# Patient Record
Sex: FEMALE | Race: WHITE | NOT HISPANIC OR LATINO | Employment: FULL TIME | URBAN - METROPOLITAN AREA
[De-identification: names, ages, dates, MRNs, and addresses within clinical notes are randomized per-mention and may not be internally consistent; named-entity substitution may affect disease eponyms.]

---

## 2017-01-16 ENCOUNTER — OFFICE VISIT (OUTPATIENT)
Dept: FAMILY MEDICINE | Facility: CLINIC | Age: 27
End: 2017-01-16
Payer: COMMERCIAL

## 2017-01-16 VITALS
RESPIRATION RATE: 17 BRPM | WEIGHT: 184.75 LBS | SYSTOLIC BLOOD PRESSURE: 98 MMHG | HEIGHT: 66 IN | BODY MASS INDEX: 29.69 KG/M2 | DIASTOLIC BLOOD PRESSURE: 70 MMHG | TEMPERATURE: 98.1 F | HEART RATE: 67 BPM | OXYGEN SATURATION: 98 %

## 2017-01-16 DIAGNOSIS — Z30.41 ENCOUNTER FOR SURVEILLANCE OF CONTRACEPTIVE PILLS: ICD-10-CM

## 2017-01-16 DIAGNOSIS — Z01.419 ENCOUNTER FOR GYNECOLOGICAL EXAMINATION WITHOUT ABNORMAL FINDING: Primary | ICD-10-CM

## 2017-01-16 DIAGNOSIS — L70.0 ACNE VULGARIS: ICD-10-CM

## 2017-01-16 PROCEDURE — 99395 PREV VISIT EST AGE 18-39: CPT | Performed by: PHYSICIAN ASSISTANT

## 2017-01-16 PROCEDURE — G0145 SCR C/V CYTO,THINLAYER,RESCR: HCPCS | Performed by: PHYSICIAN ASSISTANT

## 2017-01-16 RX ORDER — LEVONORGESTREL/ETHIN.ESTRADIOL 0.1-0.02MG
1 TABLET ORAL DAILY
Qty: 84 TABLET | Refills: 3 | Status: SHIPPED | OUTPATIENT
Start: 2017-01-16 | End: 2017-12-28

## 2017-01-16 NOTE — MR AVS SNAPSHOT
After Visit Summary   1/16/2017    Poonam Jeronimo    MRN: [id number]           Patient Information     Date Of Birth          1990        Visit Information        Provider Department      1/16/2017 7:40 AM Brigette Metcalf PA-C [facility]        Today's Diagnoses     Encounter for gynecological examination without abnormal finding    -  1     Acne vulgaris         Encounter for surveillance of contraceptive pills           Care Instructions      Preventive Health Recommendations  Female Ages 26 - 39  Yearly exam:   See your health care provider every year in order to    Review health changes.     Discuss preventive care.      Review your medicines if you your doctor has prescribed any.    Until age 30: Get a Pap test every three years (more often if you have had an abnormal result).    After age 30: Talk to your doctor about whether you should have a Pap test every 3 years or have a Pap test with HPV screening every 5 years.   You do not need a Pap test if your uterus was removed (hysterectomy) and you have not had cancer.  You should be tested each year for STDs (sexually transmitted diseases), if you're at risk.   Talk to your provider about how often to have your cholesterol checked.  If you are at risk for diabetes, you should have a diabetes test (fasting glucose).  Shots: Get a flu shot each year. Get a tetanus shot every 10 years.   Nutrition:     Eat at least 5 servings of fruits and vegetables each day.    Eat whole-grain bread, whole-wheat pasta and brown rice instead of white grains and rice.    Talk to your provider about Calcium and Vitamin D.     Lifestyle    Exercise at least 150 minutes a week (30 minutes a day, 5 days of the week). This will help you control your weight and prevent disease.    Limit alcohol to one drink per day.    No smoking.     Wear sunscreen to prevent skin cancer.    See your dentist every six months for an exam and cleaning.          "   Follow-ups after your visit        Who to contact     If you have questions or need follow up information about today's clinic visit or your schedule please contact [facility] directly at [phone].  Normal or non-critical lab and imaging results will be communicated to you by MyChart, letter or phone within 4 business days after the clinic has received the results. If you do not hear from us within 7 days, please contact the clinic through [employer] or phone. If you have a critical or abnormal lab result, we will notify you by phone as soon as possible.  Submit refill requests through [employer] or call your pharmacy and they will forward the refill request to us. Please allow 3 business days for your refill to be completed.          Additional Information About Your Visit        [employer] Information     [employer] gives you secure access to your electronic health record. If you see a primary care provider, you can also send messages to your care team and make appointments. If you have questions, please call your primary care clinic.  If you do not have a primary care provider, please call [phone] and they will assist you.        Care EveryWhere ID     This is your Care EveryWhere ID. This could be used by other organizations to access your [facility] medical records  [id number]        Your Vitals Were     Pulse Temperature Respirations    67 98.1  F (36.7  C) (Oral) 17    Height BMI (Body Mass Index) Pulse Oximetry    5' 6\" (1.676 m) 29.83 kg/m2 98%    Last Period          12/28/2016 (Approximate)         Blood Pressure from Last 3 Encounters:   01/16/17 98/70   01/06/16 114/61   12/07/15 107/65    Weight from Last 3 Encounters:   01/16/17 184 lb 12 oz (83.802 kg)   01/06/16 180 lb (81.647 kg)   12/07/15 182 lb 4.8 oz (82.691 kg)              We Performed the Following     PAP imaged thin layer screen reflex to HPV if ASCUS - recommended age 25 - 29 years          Today's Medication Changes        "   These changes are accurate as of: 1/16/17  7:49 AM.  If you have any questions, ask your nurse or doctor.               These medicines have changed or have updated prescriptions.        Dose/Directions    levonorgestrel-ethinyl estradiol 0.1-20 MG-MCG per tablet   Commonly known as:  LUTERA   This may have changed:  additional instructions   Used for:  Encounter for surveillance of contraceptive pills, Acne vulgaris   Changed by:  Brigette Metcalf PA-C        Dose:  1 tablet   Take 1 tablet by mouth daily   Quantity:  84 tablet   Refills:  3         Stop taking these medicines if you haven't already. Please contact your care team if you have questions.     atovaquone-proguanil 250-100 MG per tablet   Commonly known as:  MALARONE   Stopped by:  Brigette Metcalf PA-C           azithromycin 500 MG tablet   Commonly known as:  ZITHROMAX   Stopped by:  Brigette Metcalf PA-C           doxycycline 100 MG tablet   Commonly known as:  VIBRA-TABS   Stopped by:  Brigette Metcalf PA-C           spironolactone 50 MG tablet   Commonly known as:  ALDACTONE   Stopped by:  Brigette Metcalf PA-C                Where to get your medicines      These medications were sent to [facility] [address] [place] [address] AT [address] [address]    Hours:  24-hours Phone:  [phone]    - levonorgestrel-ethinyl estradiol 0.1-20 MG-MCG per tablet             Primary Care Provider Office Phone # Fax #    Rosa Maria Zamora [phone]       [address] [place] [id number]        Thank you!     Thank you for choosing [facility]  for your care. Our goal is always to provide you with excellent care. Hearing back from our patients is one way we can continue to improve our services. Please take a few minutes to complete the written survey that you may  receive in the mail after your visit with us. Thank you!             Your Updated Medication List - Protect others around you: Learn how to safely use, store and throw away your medicines at www.disposemymeds.org.          This list is accurate as of: 1/16/17  7:49 AM.  Always use your most recent med list.                   Brand Name Dispense Instructions for use    levonorgestrel-ethinyl estradiol 0.1-20 MG-MCG per tablet    LUTERA    84 tablet    Take 1 tablet by mouth daily

## 2017-01-16 NOTE — NURSING NOTE
"Chief Complaint   Patient presents with     Physical       Initial BP 98/70 mmHg  Pulse 67  Temp(Src) 98.1  F (36.7  C) (Oral)  Resp 17  Ht 5' 6\" (1.676 m)  Wt 184 lb 12 oz (83.802 kg)  BMI 29.83 kg/m2  SpO2 98%  LMP 12/28/2016 (Approximate) Estimated body mass index is 29.83 kg/(m^2) as calculated from the following:    Height as of this encounter: 5' 6\" (1.676 m).    Weight as of this encounter: 184 lb 12 oz (83.802 kg).  BP completed using cuff size: mary Rogers CMA  "

## 2017-01-16 NOTE — PROGRESS NOTES
SUBJECTIVE:     CC: Poonam Jeronimo is an 26 year old woman who presents for preventive health visit.     Healthy Habits:    Do you get at least three servings of calcium containing foods daily (dairy, green leafy vegetables, etc.)? yes    Amount of exercise or daily activities, outside of work: 2-3 day(s) per week    Problems taking medications regularly: No    Medication side effects: No    Have you had an eye exam in the past two years? Yes    Do you see a dentist twice per year? yes    Do you have sleep apnea, excessive snoring or daytime drowsiness? no        Today's PHQ-2 Score:   PHQ-2 ( 1999 Pfizer) 1/16/2017 2/9/2015   Q1: Little interest or pleasure in doing things 0 0   Q2: Feeling down, depressed or hopeless 0 0   PHQ-2 Score 0 0       Abuse: Current or Past(Physical, Sexual or Emotional)- No  Do you feel safe in your environment - Yes    Social History   Substance Use Topics     Smoking status: Never Smoker      Smokeless tobacco: Never Used     Alcohol Use: 1.5 - 2.0 oz/week     3-4 Standard drinks or equivalent per week     The patient does not drink >3 drinks per day nor >7 drinks per week.    Reviewed orders with patient.  Reviewed health maintenance and updated orders accordingly - Yes    Mammo Decision Support:  Mammogram not appropriate for this patient based on age.    Pertinent mammograms are reviewed under the imaging tab.  History of abnormal Pap smear: NO - age 21-29 PAP every 3 years recommended  All Histories reviewed and updated in Epic.      ROS:  C: NEGATIVE for fever, chills, change in weight  I: NEGATIVE for worrisome rashes, moles or lesions  E: NEGATIVE for vision changes or irritation  ENT: NEGATIVE for ear, mouth and throat problems  R: NEGATIVE for significant cough or SOB  B: NEGATIVE for masses, tenderness or discharge  CV: NEGATIVE for chest pain, palpitations or peripheral edema  GI: NEGATIVE for nausea, abdominal pain, heartburn, or change in bowel habits  : NEGATIVE for  "unusual urinary or vaginal symptoms. Periods are regular.  M: NEGATIVE for significant arthralgias or myalgia  N: NEGATIVE for weakness, dizziness or paresthesias  P: NEGATIVE for changes in mood or affect    Problem list, Medication list, Allergies, and Medical/Social/Surgical histories reviewed in [facility] and updated as appropriate.  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 No pertinent past surgical history.    Social History   Substance Use Topics     Smoking status: Never Smoker      Smokeless tobacco: Never Used     Alcohol Use: 1.5 - 2.0 oz/week     3-4 Standard drinks or equivalent per week     Family History   Problem Relation Age of Onset     Breast Cancer Maternal Grandmother      In 50s.      CANCER No family hx of      skin cancer         Current Outpatient Prescriptions   Medication Sig Dispense Refill     levonorgestrel-ethinyl estradiol (LUTERA) 0.1-20 MG-MCG per tablet Take 1 tablet by mouth daily 84 tablet 3     [DISCONTINUED] levonorgestrel-ethinyl estradiol (LUTERA) 0.1-20 MG-MCG per tablet Take 1 tablet by mouth daily LETTER SENT/ MD APPT. FOR REFILLS 30 tablet 0     No Known Allergies  OBJECTIVE:     BP 98/70 mmHg  Pulse 67  Temp(Src) 98.1  F (36.7  C) (Oral)  Resp 17  Ht 5' 6\" (1.676 m)  Wt 184 lb 12 oz (83.802 kg)  BMI 29.83 kg/m2  SpO2 98%  LMP 12/28/2016 (Approximate)  EXAM:  GENERAL: healthy, alert and no distress  EYES: Eyes grossly normal to inspection, PERRL and conjunctivae and sclerae normal  HENT: ear canals and TM's normal, nose and mouth without ulcers or lesions  NECK: no adenopathy, no asymmetry, masses, or scars and thyroid normal to palpation  RESP: lungs clear to auscultation - no rales, rhonchi or wheezes  BREAST: normal without masses, tenderness or nipple discharge and no palpable axillary masses or adenopathy  CV: regular rate and rhythm, normal S1 S2, no S3 or S4, no " "murmur, click or rub, no peripheral edema and peripheral pulses strong  ABDOMEN: soft, nontender, no hepatosplenomegaly, no masses and bowel sounds normal   (female): normal female external genitalia, normal urethral meatus, vaginal mucosa pink, moist, well rugated, and normal cervix/adnexa/uterus without masses or discharge; pap smear done today  MS: no gross musculoskeletal defects noted, no edema  SKIN: no suspicious lesions or rashes  NEURO: Normal strength and tone, mentation intact and speech normal  PSYCH: mentation appears normal, affect normal/bright    ASSESSMENT/PLAN:         ICD-10-CM    1. Encounter for gynecological examination without abnormal finding Z01.419 PAP imaged thin layer screen reflex to HPV if ASCUS - recommended age 25 - 29 years   2. Acne vulgaris L70.0 levonorgestrel-ethinyl estradiol (LUTERA) 0.1-20 MG-MCG per tablet   3. Encounter for surveillance of contraceptive pills Z30.41 levonorgestrel-ethinyl estradiol (LUTERA) 0.1-20 MG-MCG per tablet       COUNSELING:   Reviewed preventive health counseling, as reflected in patient instructions       Regular exercise       Healthy diet/nutrition       Vision screening       Safe sex practices/STD prevention     reports that she has never smoked. She has never used smokeless tobacco.  Estimated body mass index is 29.83 kg/(m^2) as calculated from the following:    Height as of this encounter: 5' 6\" (1.676 m).    Weight as of this encounter: 184 lb 12 oz (83.802 kg).   Weight management plan: Discussed healthy diet and exercise guidelines and patient will follow up in 12 months in clinic to re-evaluate.    Counseling Resources:  ATP IV Guidelines  Pooled Cohorts Equation Calculator  Breast Cancer Risk Calculator  FRAX Risk Assessment  ICSI Preventive Guidelines  Dietary Guidelines for Americans, 2010  USDA's MyPlate  ASA Prophylaxis  Lung CA Screening    Brigette Metcalf PA-C  [facility]  "

## 2017-01-18 LAB
COPATH REPORT: NORMAL
PAP: NORMAL

## 2017-12-26 DIAGNOSIS — L70.0 ACNE VULGARIS: ICD-10-CM

## 2017-12-26 DIAGNOSIS — Z30.41 ENCOUNTER FOR SURVEILLANCE OF CONTRACEPTIVE PILLS: ICD-10-CM

## 2017-12-27 NOTE — TELEPHONE ENCOUNTER
Requested Prescriptions   Pending Prescriptions Disp Refills     AVIANE 0.1-20 MG-MCG per tablet [Pharmacy Med Name: AVIANE-28 TABLETS 28]  Last Written Prescription Date:  1/16/2017  Last Fill Quantity: 84 tablet,  # refills: 3   Last Office Visit with [facility], P or [facility] prescribing provider:  1/16/2017   Future Office Visit:      84 tablet 0     Sig: TAKE 1 TABLET BY MOUTH DAILY    Contraceptives Protocol Passed    12/26/2017  9:00 PM       Passed - Patient is not a current smoker if age is 35 or older       Passed - Recent or future visit with authorizing provider's specialty    Patient had office visit in the last year or has a visit in the next 30 days with authorizing provider.  See chart review.          Passed - No active pregnancy on record       Passed - No positive pregnancy test in past 12 months

## 2017-12-28 RX ORDER — LEVONORGESTREL AND ETHINYL ESTRADIOL 0.1-0.02MG
KIT ORAL
Qty: 84 TABLET | Refills: 0 | OUTPATIENT
Start: 2017-12-28

## 2017-12-28 RX ORDER — LEVONORGESTREL/ETHIN.ESTRADIOL 0.1-0.02MG
1 TABLET ORAL DAILY
Qty: 84 TABLET | Refills: 0 | Status: SHIPPED | OUTPATIENT
Start: 2017-12-28 | End: 2018-02-14

## 2017-12-28 NOTE — TELEPHONE ENCOUNTER
Reminder placed on refill order that she is due for annual office visit.    Prescription approved per [facility] Refill Protocol.  Sandra Haines RN

## 2018-02-14 ENCOUNTER — OFFICE VISIT (OUTPATIENT)
Dept: FAMILY MEDICINE | Facility: CLINIC | Age: 28
End: 2018-02-14
Payer: COMMERCIAL

## 2018-02-14 VITALS
SYSTOLIC BLOOD PRESSURE: 97 MMHG | DIASTOLIC BLOOD PRESSURE: 61 MMHG | BODY MASS INDEX: 27.64 KG/M2 | HEART RATE: 69 BPM | RESPIRATION RATE: 14 BRPM | TEMPERATURE: 98.4 F | HEIGHT: 66 IN | OXYGEN SATURATION: 98 % | WEIGHT: 172 LBS

## 2018-02-14 DIAGNOSIS — Z00.00 ROUTINE GENERAL MEDICAL EXAMINATION AT A HEALTH CARE FACILITY: Primary | ICD-10-CM

## 2018-02-14 DIAGNOSIS — Z13.220 SCREENING FOR LIPID DISORDERS: ICD-10-CM

## 2018-02-14 DIAGNOSIS — L70.0 ACNE VULGARIS: ICD-10-CM

## 2018-02-14 DIAGNOSIS — Z30.41 ENCOUNTER FOR SURVEILLANCE OF CONTRACEPTIVE PILLS: ICD-10-CM

## 2018-02-14 DIAGNOSIS — Z13.1 SCREENING FOR DIABETES MELLITUS: ICD-10-CM

## 2018-02-14 LAB
CHOLEST SERPL-MCNC: 169 MG/DL
GLUCOSE SERPL-MCNC: 78 MG/DL (ref 70–99)
HDLC SERPL-MCNC: 51 MG/DL
LDLC SERPL CALC-MCNC: 101 MG/DL
NONHDLC SERPL-MCNC: 118 MG/DL
TRIGL SERPL-MCNC: 85 MG/DL

## 2018-02-14 PROCEDURE — 82947 ASSAY GLUCOSE BLOOD QUANT: CPT | Performed by: FAMILY MEDICINE

## 2018-02-14 PROCEDURE — 80061 LIPID PANEL: CPT | Performed by: FAMILY MEDICINE

## 2018-02-14 PROCEDURE — 99395 PREV VISIT EST AGE 18-39: CPT | Performed by: FAMILY MEDICINE

## 2018-02-14 PROCEDURE — 36415 COLL VENOUS BLD VENIPUNCTURE: CPT | Performed by: FAMILY MEDICINE

## 2018-02-14 RX ORDER — LEVONORGESTREL/ETHIN.ESTRADIOL 0.1-0.02MG
1 TABLET ORAL DAILY
Qty: 84 TABLET | Refills: 3 | Status: SHIPPED | OUTPATIENT
Start: 2018-02-14 | End: 2019-02-11

## 2018-02-14 NOTE — PROGRESS NOTES
SUBJECTIVE:   CC: Poonam Jeronimo is an 27 year old woman who presents for preventive health visit.     Physical   Annual:     Getting at least 3 servings of Calcium per day::  Yes    Bi-annual eye exam::  NO    Dental care twice a year::  Yes    Sleep apnea or symptoms of sleep apnea::  None    Diet::  Other    Frequency of exercise::  None    Taking medications regularly::  Yes    Medication side effects::  None    Additional concerns today::  No            Refill birth control    Today's PHQ-2 Score:   PHQ-2 ( 1999 Pfizer) 2/14/2018   Q1: Little interest or pleasure in doing things 0   Q2: Feeling down, depressed or hopeless 0   PHQ-2 Score 0   Q1: Little interest or pleasure in doing things Not at all   Q2: Feeling down, depressed or hopeless Not at all   PHQ-2 Score 0       Abuse: Current or Past(Physical, Sexual or Emotional)- No  Do you feel safe in your environment - Yes    Social History   Substance Use Topics     Smoking status: Never Smoker     Smokeless tobacco: Never Used     Alcohol use 1.5 - 2.0 oz/week     3 - 4 Standard drinks or equivalent per week     Alcohol Use 2/14/2018   If you drink alcohol, do you typically have greater than 3 drinks per day OR greater than 7 drinks per week?   No       Reviewed orders with patient.  Reviewed health maintenance and updated orders accordingly - Yes  Labs reviewed in EPIC    Mammogram not appropriate for this patient based on age.    Pertinent mammograms are reviewed under the imaging tab.  History of abnormal Pap smear: NO - age 21-29 PAP every 3 years recommended    Reviewed and updated as needed this visit by clinical staff         Reviewed and updated as needed this visit by Provider            Review of Systems  C: NEGATIVE for fever, chills, change in weight  I: NEGATIVE for worrisome rashes, moles or lesions  E: NEGATIVE for vision changes or irritation  ENT: NEGATIVE for ear, mouth and throat problems  R: NEGATIVE for significant cough or SOB  B:  "NEGATIVE for masses, tenderness or discharge  CV: NEGATIVE for chest pain, palpitations or peripheral edema  GI: NEGATIVE for nausea, abdominal pain, heartburn, or change in bowel habits  : NEGATIVE for unusual urinary or vaginal symptoms. Periods are regular.  M: NEGATIVE for significant arthralgias or myalgia  N: NEGATIVE for weakness, dizziness or paresthesias  P: NEGATIVE for changes in mood or affect     OBJECTIVE:   Physical Exam   BP 97/61 (BP Location: Left arm)  Pulse 69  Temp 98.4  F (36.9  C) (Oral)  Resp 14  Ht 5' 6\" (1.676 m)  Wt 172 lb (78 kg)  LMP 01/24/2018 (Approximate)  SpO2 98%  BMI 27.76 kg/m2  GENERAL: healthy, alert and no distress  EYES: Eyes grossly normal to inspection, PERRL and conjunctivae and sclerae normal  HENT: ear canals and TM's normal, nose and mouth without ulcers or lesions  NECK: no adenopathy, no asymmetry, masses, or scars and thyroid normal to palpation  RESP: lungs clear to auscultation - no rales, rhonchi or wheezes  BREAST: normal without masses, tenderness or nipple discharge and no palpable axillary masses or adenopathy  CV: regular rate and rhythm, normal S1 S2  ABDOMEN: soft, nontender, no hepatosplenomegaly, no masses and bowel sounds normal  MS: no gross musculoskeletal defects noted, no edema  SKIN: no suspicious lesions or rashes  NEURO: Normal strength and tone, mentation intact and speech normal  PSYCH: mentation appears normal, affect normal/bright    ASSESSMENT/PLAN:       1. Routine general medical examination at a health care facility  - see below     2. Encounter for surveillance of contraceptive pills  - refilled   - levonorgestrel-ethinyl estradiol (LUTERA) 0.1-20 MG-MCG per tablet; Take 1 tablet by mouth daily  Dispense: 84 tablet; Refill: 3    3. Acne vulgaris  - levonorgestrel-ethinyl estradiol (LUTERA) 0.1-20 MG-MCG per tablet; Take 1 tablet by mouth daily  Dispense: 84 tablet; Refill: 3    4. Screening for diabetes mellitus  - Glucose    5. " "Screening for lipid disorders  - Lipid Profile (Chol, Trig, HDL, LDL calc)    COUNSELING:  Reviewed preventive health counseling, as reflected in patient instructions       Regular exercise         reports that she has never smoked. She has never used smokeless tobacco.    Estimated body mass index is 29.82 kg/(m^2) as calculated from the following:    Height as of 1/16/17: 5' 6\" (1.676 m).    Weight as of 1/16/17: 184 lb 12 oz (83.8 kg).       Counseling Resources:  ATP IV Guidelines  Pooled Cohorts Equation Calculator  Breast Cancer Risk Calculator  FRAX Risk Assessment  ICSI Preventive Guidelines  Dietary Guidelines for Americans, 2010  USDA's MyPlate  ASA Prophylaxis  Lung CA Screening    Deqa Jb Ferreira MD  [facility]  Answers for HPI/ROS submitted by the patient on 2/14/2018   PHQ-2 Score: 0    "

## 2018-02-14 NOTE — MR AVS SNAPSHOT
After Visit Summary   2/14/2018    Poonam Jeronimo    MRN: [id number]           Patient Information     Date Of Birth          1990        Visit Information        Provider Department      2/14/2018 7:20 AM Gilda Ferreira MD [facility]        Today's Diagnoses     Routine general medical examination at a health care facility    -  1    Encounter for surveillance of contraceptive pills        Acne vulgaris        Screening for diabetes mellitus        Screening for lipid disorders          Care Instructions      Preventive Health Recommendations  Female Ages 26 - 39  Yearly exam:   See your health care provider every year in order to    Review health changes.     Discuss preventive care.      Review your medicines if you your doctor has prescribed any.    Until age 30: Get a Pap test every three years (more often if you have had an abnormal result).    After age 30: Talk to your doctor about whether you should have a Pap test every 3 years or have a Pap test with HPV screening every 5 years.   You do not need a Pap test if your uterus was removed (hysterectomy) and you have not had cancer.  You should be tested each year for STDs (sexually transmitted diseases), if you're at risk.   Talk to your provider about how often to have your cholesterol checked.  If you are at risk for diabetes, you should have a diabetes test (fasting glucose).  Shots: Get a flu shot each year. Get a tetanus shot every 10 years.   Nutrition:     Eat at least 5 servings of fruits and vegetables each day.    Eat whole-grain bread, whole-wheat pasta and brown rice instead of white grains and rice.    Talk to your provider about Calcium and Vitamin D.     Lifestyle    Exercise at least 150 minutes a week (30 minutes a day, 5 days of the week). This will help you control your weight and prevent disease.    Limit alcohol to one drink per day.    No smoking.     Wear sunscreen to prevent skin cancer.    See  "your dentist every six months for an exam and cleaning.            Follow-ups after your visit        Who to contact     If you have questions or need follow up information about today's clinic visit or your schedule please contact [facility] directly at [phone].  Normal or non-critical lab and imaging results will be communicated to you by MyChart, letter or phone within 4 business days after the clinic has received the results. If you do not hear from us within 7 days, please contact the clinic through [email or web address] or phone. If you have a critical or abnormal lab result, we will notify you by phone as soon as possible.  Submit refill requests through [employer] or call your pharmacy and they will forward the refill request to us. Please allow 3 business days for your refill to be completed.          Additional Information About Your Visit        MyChart Information     [employer] gives you secure access to your electronic health record. If you see a primary care provider, you can also send messages to your care team and make appointments. If you have questions, please call your primary care clinic.  If you do not have a primary care provider, please call [phone] and they will assist you.        Care EveryWhere ID     This is your Care EveryWhere ID. This could be used by other organizations to access your [place] medical records  [id number]        Your Vitals Were     Pulse Temperature Respirations Height Last Period Pulse Oximetry    69 98.4  F (36.9  C) (Oral) 14 5' 6\" (1.676 m) 01/24/2018 (Approximate) 98%    BMI (Body Mass Index)                   27.76 kg/m2            Blood Pressure from Last 3 Encounters:   02/14/18 97/61   01/16/17 98/70   01/06/16 114/61    Weight from Last 3 Encounters:   02/14/18 172 lb (78 kg)   01/16/17 184 lb 12 oz (83.8 kg)   01/06/16 180 lb (81.6 kg)              We Performed the Following     Glucose     Lipid Profile (Chol, Trig, HDL, LDL calc)          Where to " get your medicines      These medications were sent to [id number] - [facility] [address] [facility] [address]  [address] [address]     Phone:  [phone]     levonorgestrel-ethinyl estradiol 0.1-20 MG-MCG per tablet          Primary Care Provider Office Phone # Fax #    Rosa Maria Eboni Zamora [phone]       [address] [place] [address] [address]  [facility] [address]        Equal Access to Services     [facility]: Hadii aad ku hadasho Soomaali, waaxda luqadaha, qaybta kaalmada adeegyada, niko fournier . So [facility] [phone].    ATENCIÓN: Si habla español, tiene a villalta disposición servicios gratuitos de asistencia lingüística. [facility] [phone].    We comply with applicable federal civil rights laws and [place] laws. We do not discriminate on the basis of race, color, national origin, age, disability, sex, sexual orientation, or gender identity.            Thank you!     Thank you for choosing [facility]  for your care. Our goal is always to provide you with excellent care. Hearing back from our patients is one way we can continue to improve our services. Please take a few minutes to complete the written survey that you may receive in the mail after your visit with us. Thank you!             Your Updated Medication List - Protect others around you: Learn how to safely use, store and throw away your medicines at www.disposemymeds.org.          This list is accurate as of 2/14/18  7:52 AM.  Always use your most recent med list.                   Brand Name Dispense Instructions for use Diagnosis    levonorgestrel-ethinyl estradiol 0.1-20 MG-MCG per tablet    LUTERA    84 tablet    Take 1 tablet by mouth daily    Encounter for surveillance of contraceptive pills, Acne vulgaris

## 2018-02-14 NOTE — NURSING NOTE
"Chief Complaint   Patient presents with     Physical       Initial BP 97/61 (BP Location: Left arm)  Pulse 69  Temp 98.4  F (36.9  C) (Oral)  Resp 14  Ht 5' 6\" (1.676 m)  Wt 172 lb (78 kg)  LMP 01/24/2018 (Approximate)  SpO2 98%  BMI 27.76 kg/m2 Estimated body mass index is 27.76 kg/(m^2) as calculated from the following:    Height as of this encounter: 5' 6\" (1.676 m).    Weight as of this encounter: 172 lb (78 kg).  Medication Reconciliation: complete     Megan Shah CMA      "

## 2018-06-28 ENCOUNTER — OFFICE VISIT (OUTPATIENT)
Dept: OBGYN | Facility: CLINIC | Age: 28
End: 2018-06-28
Payer: COMMERCIAL

## 2018-06-28 VITALS — BODY MASS INDEX: 28.25 KG/M2 | DIASTOLIC BLOOD PRESSURE: 80 MMHG | WEIGHT: 175 LBS | SYSTOLIC BLOOD PRESSURE: 104 MMHG

## 2018-06-28 DIAGNOSIS — Z84.2 FAMILY HISTORY OF ENDOMETRIOSIS: Primary | ICD-10-CM

## 2018-06-28 PROCEDURE — 99203 OFFICE O/P NEW LOW 30 MIN: CPT | Performed by: ADVANCED PRACTICE MIDWIFE

## 2018-06-28 NOTE — MR AVS SNAPSHOT
After Visit Summary   6/28/2018    Poonam Jeronimo    MRN: [id number]           Patient Information     Date Of Birth          1990        Visit Information        Provider Department      6/28/2018 8:30 AM Juana Monroe APRN CNM [facility]        Today's Diagnoses     Family history of endometriosis    -  1       Follow-ups after your visit        Follow-up notes from your care team     Return if symptoms worsen or fail to improve.      Your next 10 appointments already scheduled     Aug 01, 2018  8:15 AM CDT   US PELVIC COMPLETE W TRANSVAGINAL with RIUS1   [facility] ([facility])    [address]  [address]  [facility] [address] [phone]           Please bring a list of your medicines (including vitamins, minerals and over-the-counter drugs). Also, tell your doctor about any allergies you may have. Wear comfortable clothes and leave your valuables at home.  Adults: Drink six 8-ounce glasses of fluid one hour before your exam. Do NOT empty your bladder.  If you need to empty your bladder before your exam, try to release only a little bit of urine. Then, drink another 8oz glass of fluid.  Children: Children who are potty trained should drink at least 4 cups (32 oz) of liquid 45 minutes to one hour prior to the exam. The child s bladder must be full in order to achieve a diagnostic exam. If your child is very uncomfortable or has an urgent need to pee, please notify a technologist; they will try to find out how much longer the wait may be and provide instructions to help relieve the pressure. Occasionally it is medically necessary to insert a urinary catheter to fill the bladder.  Please call the Imaging Department at your exam site with any questions.              Future tests that were ordered for you today     Open Future Orders        Priority Expected Expires Ordered    US Pelvic Complete w Transvaginal Routine   6/28/2019 6/28/2018            Who to contact     If you have questions or need follow up information about today's clinic visit or your schedule please contact [facility] directly at [phone].  Normal or non-critical lab and imaging results will be communicated to you by MyChart, letter or phone within 4 business days after the clinic has received the results. If you do not hear from us within 7 days, please contact the clinic through MyChart or phone. If you have a critical or abnormal lab result, we will notify you by phone as soon as possible.  Submit refill requests through [employer] or call your pharmacy and they will forward the refill request to us. Please allow 3 business days for your refill to be completed.          Additional Information About Your Visit        [email or web address] Information     [employer] gives you secure access to your electronic health record. If you see a primary care provider, you can also send messages to your care team and make appointments. If you have questions, please call your primary care clinic.  If you do not have a primary care provider, please call [phone] and they will assist you.        Care EveryWhere ID     This is your Care EveryWhere ID. This could be used by other organizations to access your [place] medical records  [id number]        Your Vitals Were     Last Period BMI (Body Mass Index)                06/12/2018 (Exact Date) 28.25 kg/m2           Blood Pressure from Last 3 Encounters:   06/28/18 104/80   02/14/18 97/61   01/16/17 98/70    Weight from Last 3 Encounters:   06/28/18 175 lb (79.4 kg)   02/14/18 172 lb (78 kg)   01/16/17 184 lb 12 oz (83.8 kg)               Primary Care Provider Office Phone # Fax #    Rosa Maria Zamora [phone]       [address] [place] [address] [address] [place] [address]        Equal Access to Services     CHAN MALAVE AH: Stephanie Narayan, regine jeffery, claudia huang,  niko ruelasrossy aceves'aan ah. So [facility] [phone].    ATENCIÓN: Si habla latisha, tiene a villalta disposición servicios gratuitos de asistencia lingüística. [facility] al [phone].    We comply with applicable federal civil rights laws and [place] laws. We do not discriminate on the basis of race, color, national origin, age, disability, sex, sexual orientation, or gender identity.            Thank you!     Thank you for choosing [facility]  for your care. Our goal is always to provide you with excellent care. Hearing back from our patients is one way we can continue to improve our services. Please take a few minutes to complete the written survey that you may receive in the mail after your visit with us. Thank you!             Your Updated Medication List - Protect others around you: Learn how to safely use, store and throw away your medicines at www.disposemymeds.org.          This list is accurate as of 6/28/18  9:22 AM.  Always use your most recent med list.                   Brand Name Dispense Instructions for use Diagnosis    levonorgestrel-ethinyl estradiol 0.1-20 MG-MCG per tablet    LUTERA    84 tablet    Take 1 tablet by mouth daily    Encounter for surveillance of contraceptive pills, Acne vulgaris

## 2018-06-28 NOTE — PROGRESS NOTES
"  SUBJECTIVE:                                                   Poonam Jeronimo is a 27 year old who presents to clinic today for the following health issue(s):  Patient presents with:  Consult: Concerns of possible endometriosis    Patient presents to clinic today with concerns of family history of endometriosis.  Patient states she mother was diagnosed with endometriosis after her birth and had a total hysterectomy.  However, patient is unsure of reason for hysterectomy.  States she is getting  in September of this year and plans pregnancy in 1-2 years after marriage and concerned that she may not be able to conceive due to endometriosis.  States before starting oral contraceptive pill at age 19 her menses was irregular, painful and bleeding was heavy.  States she is currently on MJ and menses have improved with bleeding lasting 5 days with on heavy day and occasional \"clumpy\" bleeding.  Denies any pain with intercourse. 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       Patient's last menstrual period was 2018 (exact date).  Menstrual History: frequency: every 28-30 days  Patient is sexually active  .  Using oral contraceptives for contraception.   Health maintenance updated:  no      Problem list and histories reviewed & adjusted, as indicated.  Additional history: as documented.    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 No pertinent surgical history.   Social History   Substance Use Topics     Smoking status: Never Smoker     Smokeless tobacco: Never Used     Alcohol use 1.5 - 2.0 oz/week     3 - 4 Standard drinks or equivalent per week      Problem (# of Occurrences) Relation (Name,Age of Onset)    Breast Cancer (1) Maternal Grandmother: In 50s.        Negative family history of: Cancer            Current Outpatient Prescriptions "   Medication Sig     levonorgestrel-ethinyl estradiol (LUTERA) 0.1-20 MG-MCG per tablet Take 1 tablet by mouth daily     No current facility-administered medications for this visit.      Allergies   Allergen Reactions     Milk Protein Extract        ROS:  12 point review of systems negative other than symptoms noted below.    OBJECTIVE:     /80 (BP Location: Left arm, Patient Position: Sitting, Cuff Size: Adult Regular)  Wt 175 lb (79.4 kg)  LMP 06/12/2018 (Exact Date)  BMI 28.25 kg/m2  Body mass index is 28.25 kg/(m^2).    PHYSICAL EXAM:  Constitutional:  Appearance: Well nourished, well developed alert, in no acute distress  Respiratory:Unlabored, no signs of disress  Cardiovascular: No edema noted  Neurologic/Psychiatric:  Mental Status:  Oriented X3      In-Clinic Test Results:  No results found for this or any previous visit (from the past 24 hour(s)).    ASSESSMENT/PLAN:                                                        ICD-10-CM    1. Family history of endometriosis Z84.2 US Pelvic Complete w Transvaginal       COUNSELING:    Discussed pelvic ultrasound for further evaluation  Discussed that oral contraceptive pill is used as treatment for endometrosis  Discussed that ultrasound may not show endometriosis if located outside of uterus  Would not recommend any further work up until patient tried to conceive for 1 year or worsening symptoms after stopping oral contraceptive pill.   Return to Clinic prn      30 minutes was spent face to face with the patient today discussing her history, diagnosis, and follow-up plan as noted above. Over 50% of the visit was spent in counseling and coordination of care of family history of endometrosis.    MILADY Hwang, YORDAN, CASEY

## 2018-06-28 NOTE — NURSING NOTE
"Chief Complaint   Patient presents with     Consult     Concerns of possible endometriosis       Initial /80 (BP Location: Left arm, Patient Position: Sitting, Cuff Size: Adult Regular)  Wt 175 lb (79.4 kg)  LMP 2018 (Exact Date)  BMI 28.25 kg/m2 Estimated body mass index is 28.25 kg/(m^2) as calculated from the following:    Height as of 18: 5' 6\" (1.676 m).    Weight as of this encounter: 175 lb (79.4 kg).  BP completed using cuff size: regular        The following HM Due: NONE    patient has appointment for today.  Alvaro Loya CMA                 "

## 2019-02-11 DIAGNOSIS — L70.0 ACNE VULGARIS: ICD-10-CM

## 2019-02-11 DIAGNOSIS — Z30.41 ENCOUNTER FOR SURVEILLANCE OF CONTRACEPTIVE PILLS: ICD-10-CM

## 2019-02-12 NOTE — TELEPHONE ENCOUNTER
"Requested Prescriptions   Pending Prescriptions Disp Refills     VIENVA 0.1-20 MG-MCG tablet [Pharmacy Med Name: VIENVA 0.1MG/0.02MG TABS 28S]  Last Written Prescription Date:  2/14/2018  Last Fill Quantity: 84 tablet,  # refills: 3   Last Office Visit: 2/14/2018   Future Office Visit:      84 tablet 0     Sig: TAKE 1 TABLET BY MOUTH DAILY    Contraceptives Protocol Passed - 2/11/2019 10:58 AM       Passed - Patient is not a current smoker if age is 35 or older       Passed - Recent (12 mo) or future (30 days) visit within the authorizing provider's specialty    Patient had office visit in the last 12 months or has a visit in the next 30 days with authorizing provider or within the authorizing provider's specialty.  See \"Patient Info\" tab in inbasket, or \"Choose Columns\" in Meds & Orders section of the refill encounter.           Passed - Medication is active on med list       Passed - No active pregnancy on record       Passed - No positive pregnancy test in past 12 months          "

## 2019-02-13 RX ORDER — TIMOLOL MALEATE 5 MG/ML
SOLUTION/ DROPS OPHTHALMIC
Qty: 28 TABLET | Refills: 0 | Status: SHIPPED | OUTPATIENT
Start: 2019-02-13 | End: 2019-03-08

## 2019-02-14 NOTE — TELEPHONE ENCOUNTER
One refill only, patient due for annual physical as of 2/14/19.    Team coordinators-Please contact patient to schedule.    Thank you!  MARISSA HillmanN, RN

## 2019-03-08 ENCOUNTER — OFFICE VISIT (OUTPATIENT)
Dept: FAMILY MEDICINE | Facility: CLINIC | Age: 29
End: 2019-03-08
Payer: COMMERCIAL

## 2019-03-08 VITALS
HEIGHT: 66 IN | BODY MASS INDEX: 27.08 KG/M2 | WEIGHT: 168.5 LBS | RESPIRATION RATE: 14 BRPM | TEMPERATURE: 98.3 F | OXYGEN SATURATION: 99 % | HEART RATE: 70 BPM | SYSTOLIC BLOOD PRESSURE: 101 MMHG | DIASTOLIC BLOOD PRESSURE: 62 MMHG

## 2019-03-08 DIAGNOSIS — Z13.1 SCREENING FOR DIABETES MELLITUS: ICD-10-CM

## 2019-03-08 DIAGNOSIS — L70.0 ACNE VULGARIS: ICD-10-CM

## 2019-03-08 DIAGNOSIS — Z30.41 ENCOUNTER FOR SURVEILLANCE OF CONTRACEPTIVE PILLS: ICD-10-CM

## 2019-03-08 DIAGNOSIS — Z00.00 ROUTINE HISTORY AND PHYSICAL EXAMINATION OF ADULT: Primary | ICD-10-CM

## 2019-03-08 DIAGNOSIS — Z11.4 SCREENING FOR HIV (HUMAN IMMUNODEFICIENCY VIRUS): ICD-10-CM

## 2019-03-08 DIAGNOSIS — Z13.220 SCREENING FOR LIPID DISORDERS: ICD-10-CM

## 2019-03-08 LAB
CHOLEST SERPL-MCNC: 177 MG/DL
GLUCOSE SERPL-MCNC: 75 MG/DL (ref 70–99)
HDLC SERPL-MCNC: 56 MG/DL
HIV 1+2 AB+HIV1 P24 AG SERPL QL IA: NONREACTIVE
LDLC SERPL CALC-MCNC: 106 MG/DL
NONHDLC SERPL-MCNC: 121 MG/DL
TRIGL SERPL-MCNC: 73 MG/DL

## 2019-03-08 PROCEDURE — 87389 HIV-1 AG W/HIV-1&-2 AB AG IA: CPT | Performed by: FAMILY MEDICINE

## 2019-03-08 PROCEDURE — 99395 PREV VISIT EST AGE 18-39: CPT | Performed by: FAMILY MEDICINE

## 2019-03-08 PROCEDURE — 82947 ASSAY GLUCOSE BLOOD QUANT: CPT | Performed by: FAMILY MEDICINE

## 2019-03-08 PROCEDURE — 36415 COLL VENOUS BLD VENIPUNCTURE: CPT | Performed by: FAMILY MEDICINE

## 2019-03-08 PROCEDURE — 80061 LIPID PANEL: CPT | Performed by: FAMILY MEDICINE

## 2019-03-08 RX ORDER — LEVONORGESTREL/ETHIN.ESTRADIOL 0.1-0.02MG
1 TABLET ORAL DAILY
Qty: 84 TABLET | Refills: 3 | Status: SHIPPED | OUTPATIENT
Start: 2019-03-08 | End: 2020-08-17

## 2019-03-08 ASSESSMENT — ENCOUNTER SYMPTOMS
MYALGIAS: 0
ABDOMINAL PAIN: 0
BREAST MASS: 0
SHORTNESS OF BREATH: 0
FEVER: 0
HEARTBURN: 0
ARTHRALGIAS: 0
DIZZINESS: 0
DIARRHEA: 1
PARESTHESIAS: 0
EYE PAIN: 0
HEADACHES: 0
PALPITATIONS: 0
HEMATURIA: 0
FREQUENCY: 0
CONSTIPATION: 0
DYSURIA: 0
NERVOUS/ANXIOUS: 0
NAUSEA: 0
WEAKNESS: 0
HEMATOCHEZIA: 0
JOINT SWELLING: 0
COUGH: 0
SORE THROAT: 0
CHILLS: 0

## 2019-03-08 ASSESSMENT — MIFFLIN-ST. JEOR: SCORE: 1511.06

## 2019-03-08 NOTE — PROGRESS NOTES
SUBJECTIVE:   CC: Poonam Jeronimo is an 28 year old woman who presents for preventive health visit.     Physical   Annual:     Getting at least 3 servings of Calcium per day:  NO    Bi-annual eye exam:  NO    Dental care twice a year:  Yes    Sleep apnea or symptoms of sleep apnea:  None    Diet:  Other    Frequency of exercise:  1 day/week    Duration of exercise:  15-30 minutes    Taking medications regularly:  Not Applicable    Additional concerns today:  No    PHQ-2 Total Score: 0        Today's PHQ-2 Score:   PHQ-2 ( 1999 [employer]) 3/8/2019   Q1: Little interest or pleasure in doing things 0   Q2: Feeling down, depressed or hopeless 0   PHQ-2 Score 0   Q1: Little interest or pleasure in doing things Not at all   Q2: Feeling down, depressed or hopeless Not at all   PHQ-2 Score 0       Abuse: Current or Past(Physical, Sexual or Emotional)- No  Do you feel safe in your environment? Yes    Social History     Tobacco Use     Smoking status: Never Smoker     Smokeless tobacco: Never Used   Substance Use Topics     Alcohol use: Yes     Alcohol/week: 1.5 - 2.0 oz     Types: 3 - 4 Standard drinks or equivalent per week     Alcohol Use 3/8/2019   If you drink alcohol do you typically have greater than 3 drinks per day OR greater than 7 drinks per week? No       Reviewed orders with patient.  Reviewed health maintenance and updated orders accordingly - Yes  Labs reviewed in EPIC    Mammogram not appropriate for this patient based on age.    Pertinent mammograms are reviewed under the imaging tab.  History of abnormal Pap smear: NO - age 21-29 PAP every 3 years recommended  PAP / HPV 1/16/2017 12/3/2013   PAP NIL NIL     Reviewed and updated as needed this visit by clinical staff  Tobacco  Allergies  Meds  Med Hx  Surg Hx  Fam Hx  Soc Hx        Reviewed and updated as needed this visit by Provider            Review of Systems  CONSTITUTIONAL: NEGATIVE for fever, chills, change in weight  INTEGUMENTARU/SKIN: NEGATIVE  "for worrisome rashes, moles or lesions  EYES: NEGATIVE for vision changes or irritation  ENT: NEGATIVE for ear, mouth and throat problems  RESP: NEGATIVE for significant cough or SOB  BREAST: NEGATIVE for masses, tenderness or discharge  CV: NEGATIVE for chest pain, palpitations or peripheral edema  GI: NEGATIVE for nausea, abdominal pain, heartburn, or change in bowel habits  : NEGATIVE for unusual urinary or vaginal symptoms. Periods are regular.  MUSCULOSKELETAL: NEGATIVE for significant arthralgias or myalgia  NEURO: NEGATIVE for weakness, dizziness or paresthesias  PSYCHIATRIC: NEGATIVE for changes in mood or affect     OBJECTIVE:   /62   Pulse 70   Temp 98.3  F (36.8  C) (Oral)   Resp 14   Ht 1.676 m (5' 6\")   Wt 76.4 kg (168 lb 8 oz)   LMP 02/22/2019 (Approximate)   SpO2 99%   Breastfeeding? No   BMI 27.20 kg/m    Physical Exam  GENERAL: healthy, alert and no distress  EYES: Eyes grossly normal to inspection, conjunctivae and sclerae normal  HENT:  nose and mouth without ulcers or lesions  NECK: no adenopathy, no asymmetry, masses, or scars and thyroid normal to palpation  RESP: lungs clear to auscultation - no rales, rhonchi or wheezes  CV: regular rate and rhythm, normal S1 S2  ABDOMEN: soft, nontender, no hepatosplenomegaly, no masses and bowel sounds normal  MS: no gross musculoskeletal defects noted, no edema  SKIN: no suspicious lesions or rashes  NEURO: Normal strength and tone, mentation intact and speech normal  PSYCH: mentation appears normal, affect normal/bright    Diagnostic Test Results:  none     ASSESSMENT/PLAN:     ## Physical   - see below     ## Encounter for surveillance of contraceptive pills  - levonorgestrel-ethinyl estradiol (VIENVA) 0.1-20 MG-MCG tablet; Take 1 tablet by mouth daily  Dispense: 84 tablet; Refill: 3    ## Acne vulgaris  - levonorgestrel-ethinyl estradiol (VIENVA) 0.1-20 MG-MCG tablet; Take 1 tablet by mouth daily  Dispense: 84 tablet; Refill: 3    ## " "Screening for diabetes mellitus  - Glucose    ## Screening for lipid disorders  - Lipid Profile (Chol, Trig, HDL, LDL calc)    ## Screening for HIV (human immunodeficiency virus)  - HIV Antigen Antibody Combo    COUNSELING:  Reviewed preventive health counseling, as reflected in patient instructions    BP Readings from Last 1 Encounters:   03/08/19 101/62     Estimated body mass index is 27.2 kg/m  as calculated from the following:    Height as of this encounter: 1.676 m (5' 6\").    Weight as of this encounter: 76.4 kg (168 lb 8 oz).      Weight management plan: Discussed healthy diet and exercise guidelines     reports that  has never smoked. she has never used smokeless tobacco.      Counseling Resources:  ATP IV Guidelines  Pooled Cohorts Equation Calculator  Breast Cancer Risk Calculator  FRAX Risk Assessment  ICSI Preventive Guidelines  Dietary Guidelines for Americans, 2010  USDA's MyPlate  ASA Prophylaxis  Lung CA Screening    Gilda Ferreira MD  [facility]  "

## 2019-07-15 NOTE — PROGRESS NOTES
Subjective     Poonam Jeronimo is a 28 year old female who presents to clinic today for the following health issues:    HPI       Insect/bug bite-     Onset: 4 day(s) ago ; noticed dog tick on right lower back, she thinks she may have had it since 7/10/19 as she went on a hike about 2 pm in [place]; noticed tick on back Sunday 7/14, used tweezers to remove tick.    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     Past Surgical History:   Procedure Laterality Date     EYE SURGERY       LASIK BILATERAL         Social History     Tobacco Use     Smoking status: Never Smoker     Smokeless tobacco: Never Used   Substance Use Topics     Alcohol use: Yes     Alcohol/week: 1.5 - 2.0 oz     Types: 3 - 4 Standard drinks or equivalent per week     Family History   Problem Relation Age of Onset     Other Cancer Paternal Grandfather      Cancer No family hx of         skin cancer         Allergies   Allergen Reactions     Milk Protein Extract      Recent Labs   Lab Test 03/08/19  0825 02/14/18  0751   * 101*   HDL 56 51   TRIG 73 85      BP Readings from Last 3 Encounters:   07/17/19 99/62   03/08/19 101/62   06/28/18 104/80    Wt Readings from Last 3 Encounters:   07/17/19 78.9 kg (174 lb)   03/08/19 76.4 kg (168 lb 8 oz)   06/28/18 79.4 kg (175 lb)           Reviewed and updated as needed this visit by Provider         Review  of Systems   ROS COMP: Constitutional, HEENT, cardiovascular, pulmonary, gi and gu systems are negative, except as otherwise noted.      Objective    BP 99/62 (BP Location: Right arm, Patient Position: Sitting, Cuff Size: Adult Regular)   Pulse 68   Temp 97.6  F (36.4  C) (Tympanic)   Resp 16   Wt 78.9 kg (174 lb)   LMP 07/09/2019 (Exact Date)   SpO2 98%   Breastfeeding? No   BMI 28.08 kg/m    Body mass index is 28.08 kg/m .  Physical Exam   GENERAL: healthy, alert and no distress  RESP: no respiratory distress  MS: no gross musculoskeletal defects noted, no edema  SKIN: small scab of right lower back with no surrounding skin tenderness, erythema or induration  PSYCH: mentation appears normal, affect normal/bright    Diagnostic Test Results:  none         ASSESSMENT / PLAN:  (S30.860A,  W57.XXXA) Tick bite of back, initial encounter  (primary encounter diagnosis)  Comment: by history appears to have been a dog tick, and no acute illness noted  Plan: reassurance, not likely to have contacted Lyme disease and no other clinical signs of ehrlichiosis, babesia, etc. I do NOT recommend antibiotics at this time.  She can certainly contact clinic in future if symptoms develop.  The patient indicates understanding of these issues and agrees with the plan.   Please see patient instructions below, I especially reviewed ways to reduce tick bites.      Patient Instructions       Patient Education     Tick Bites  Ticks are small arachnids that feed on the blood of rodents, rabbits, birds, deer, dogs, and people. A tick bite may cause a reaction like a spider bite. You may have redness, itching, and slight swelling at the site. Sometimes you may have no reaction where the tick bit you.  Ticks may gorge themselves for days before you find and remove them. The bites themselves aren't cause for concern. But ticks can carry and pass on illnesses such as Lyme disease and [place] spotted fever. Both diseases begin with a  "rash and symptoms similar to the flu. In advanced stages, these diseases can be quite serious.     A \"bull's eye\" rash is a common symptom of Lyme disease.   When to go to the emergency room (ER)  Not all ticks carry disease. And a tick must stay attached for at least 24 hours to infect you. If you find a tick, don't panic. Try to carefully remove it with tweezers. Grasp the tick near its head and pull without twisting. If you can't easily dislodge the tick or if you leave the head in your skin, get medical care right away.  What to expect in the ER    The tick or any parts of the tick will be removed and the bite will be cleaned.    To prevent disease, you may be given antibiotics. Both Lyme disease and [place] spotted fever respond quickly to these medicines.    You may be asked to see your healthcare provider for a blood test to check for Lyme disease.  Follow-up care  Some states and [facility] have services that test ticks for Lyme disease and other diseases. Check with your local officials to see if this service is available in your area.  If you remove a tick yourself, watch for signs of a tick-borne illness. Symptoms may show up within a few days or weeks after a bite. Call your healthcare provider if you notice any of the following:    Rash. The rash may spread outward in a ring from a hard white lump. Or it may move up your arms and legs to your chest.    Chills and fever    Body aches and joint pain    Severe headache  Date Last Reviewed: 12/1/2016 2000-2018 [employer]. [address] [place] [address]. All rights reserved. This information is not intended as a substitute for professional medical care. Always follow your healthcare professional's instructions.           Patient Education     Tick Facts    Ticks are small arachnids that feed on the blood of rodents, rabbits, birds, deer, dogs, and humans. Ticks do not fly and do not drop from trees. They climb to the tips of " plants along trails and attach themselves to you as you brush against the plant. Ticks may also attach to you if you come in contact with an infested animal.  Avoiding ticks  The following tips will help you avoid ticks:    When walking in tick-infested areas, tuck your pants into your boots or socks, and tuck your shirt into your pants.    Wear light-colored clothing so you can easily see any ticks that get on you.    Apply a tick repellent to pants, socks, and shoes.    Avoid brushing against the plants along trails.    Check yourself and your children at the end of each day when hiking through tick-infested areas. Don't forget to check in your hair as well.  Removing ticks  Removing ticks right away will reduce the chance of disease. Here are some tips for removing ticks:    If possible, have someone else remove the tick from you.    Protect your hands from exposure to the tick by using a tissue or rubber glove.    Ticks have hook-like barbs on their mouth, which they use to attach themselves. Use tweezers or small needle-nose pliers instead of your fingers when removing a tick. Grasp the head as close to the skin as possible. Be careful not to squeeze the body, which would inject more fluid from the tick into your skin.    Pull gently and slowly away from skin until the mouthparts let go. If the tick fails to let go, stop pulling. While holding the head with tweezers, slowly turn it 90 degrees. Then, gently pull away from the skin again. This movement may unlock the tick's jaw and make it easier to remove.    Once you have removed the tick, look closely at the bite area. If you think there are still parts of the tick in your skin that you can't remove, contact your doctor.    Wash your hands and the bite site with soap and water.  Do not:    Crush or squeeze the tick with the tweezers.    Jerk the tick.    Burn or prick the tick.    Try to suffocate the tick with petroleum jelly or nail polish.  Identifying  ticks  Most tick bites are harmless. But some ticks carry diseases, such as Lyme disease and [place] spotted fever. These can spread to people. Lyme disease is of greatest concern. It is carried by only one type of tick, the deer tick. Many public health departments are able to identify a tick to figure out if it is the type that causes Lyme disease. If this service is available in your area, bring the removed tick in a zip-top bag or jar to the public health department for identification. If you cannot identify the tick and if you were bitten in an area of the country where there is a risk of Lyme disease, contact your doctor.  Date Last Reviewed: 9/1/2016 2000-2018 [employer]. [address] [place] [address], [place] [address]. All rights reserved. This information is not intended as a substitute for professional medical care. Always follow your healthcare professional's instructions.

## 2019-07-17 ENCOUNTER — OFFICE VISIT (OUTPATIENT)
Dept: FAMILY MEDICINE | Facility: CLINIC | Age: 29
End: 2019-07-17
Payer: COMMERCIAL

## 2019-07-17 VITALS
HEART RATE: 68 BPM | RESPIRATION RATE: 16 BRPM | SYSTOLIC BLOOD PRESSURE: 99 MMHG | TEMPERATURE: 97.6 F | WEIGHT: 174 LBS | BODY MASS INDEX: 28.08 KG/M2 | DIASTOLIC BLOOD PRESSURE: 62 MMHG | OXYGEN SATURATION: 98 %
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
## 2019-07-17 DIAGNOSIS — S30.860A TICK BITE OF BACK, INITIAL ENCOUNTER: Primary | ICD-10-CM

## 2019-07-17 PROCEDURE — 99213 OFFICE O/P EST LOW 20 MIN: CPT | Performed by: FAMILY MEDICINE

## 2019-07-17 NOTE — PATIENT INSTRUCTIONS
"  Patient Education     Tick Bites  Ticks are small arachnids that feed on the blood of rodents, rabbits, birds, deer, dogs, and people. A tick bite may cause a reaction like a spider bite. You may have redness, itching, and slight swelling at the site. Sometimes you may have no reaction where the tick bit you.  Ticks may gorge themselves for days before you find and remove them. The bites themselves aren't cause for concern. But ticks can carry and pass on illnesses such as Lyme disease and [place] spotted fever. Both diseases begin with a rash and symptoms similar to the flu. In advanced stages, these diseases can be quite serious.     A \"bull's eye\" rash is a common symptom of Lyme disease.   When to go to the emergency room (ER)  Not all ticks carry disease. And a tick must stay attached for at least 24 hours to infect you. If you find a tick, don't panic. Try to carefully remove it with tweezers. Grasp the tick near its head and pull without twisting. If you can't easily dislodge the tick or if you leave the head in your skin, get medical care right away.  What to expect in the ER    The tick or any parts of the tick will be removed and the bite will be cleaned.    To prevent disease, you may be given antibiotics. Both Lyme disease and [place] spotted fever respond quickly to these medicines.    You may be asked to see your healthcare provider for a blood test to check for Lyme disease.  Follow-up care  Some states and counties have services that test ticks for Lyme disease and other diseases. Check with your local officials to see if this service is available in your area.  If you remove a tick yourself, watch for signs of a tick-borne illness. Symptoms may show up within a few days or weeks after a bite. Call your healthcare provider if you notice any of the following:    Rash. The rash may spread outward in a ring from a hard white lump. Or it may move up your arms and legs to your " chest.    Chills and fever    Body aches and joint pain    Severe headache  Date Last Reviewed: 12/1/2016 2000-2018 [employer]. [address] [place] [address], [place] [address]. All rights reserved. This information is not intended as a substitute for professional medical care. Always follow your healthcare professional's instructions.           Patient Education     Tick Facts    Ticks are small arachnids that feed on the blood of rodents, rabbits, birds, deer, dogs, and humans. Ticks do not fly and do not drop from trees. They climb to the tips of plants along trails and attach themselves to you as you brush against the plant. Ticks may also attach to you if you come in contact with an infested animal.  Avoiding ticks  The following tips will help you avoid ticks:    When walking in tick-infested areas, tuck your pants into your boots or socks, and tuck your shirt into your pants.    Wear light-colored clothing so you can easily see any ticks that get on you.    Apply a tick repellent to pants, socks, and shoes.    Avoid brushing against the plants along trails.    Check yourself and your children at the end of each day when hiking through tick-infested areas. Don't forget to check in your hair as well.  Removing ticks  Removing ticks right away will reduce the chance of disease. Here are some tips for removing ticks:    If possible, have someone else remove the tick from you.    Protect your hands from exposure to the tick by using a tissue or rubber glove.    Ticks have hook-like barbs on their mouth, which they use to attach themselves. Use tweezers or small needle-nose pliers instead of your fingers when removing a tick. Grasp the head as close to the skin as possible. Be careful not to squeeze the body, which would inject more fluid from the tick into your skin.    Pull gently and slowly away from skin until the mouthparts let go. If the tick fails to let go, stop pulling. While holding the head  with tweezers, slowly turn it 90 degrees. Then, gently pull away from the skin again. This movement may unlock the tick's jaw and make it easier to remove.    Once you have removed the tick, look closely at the bite area. If you think there are still parts of the tick in your skin that you can't remove, contact your doctor.    Wash your hands and the bite site with soap and water.  Do not:    Crush or squeeze the tick with the tweezers.    Jerk the tick.    Burn or prick the tick.    Try to suffocate the tick with petroleum jelly or nail polish.  Identifying ticks  Most tick bites are harmless. But some ticks carry diseases, such as Lyme disease and [place] spotted fever. These can spread to people. Lyme disease is of greatest concern. It is carried by only one type of tick, the deer tick. Many public health departments are able to identify a tick to figure out if it is the type that causes Lyme disease. If this service is available in your area, bring the removed tick in a zip-top bag or jar to the public health department for identification. If you cannot identify the tick and if you were bitten in an area of the country where there is a risk of Lyme disease, contact your doctor.  Date Last Reviewed: 9/1/2016 2000-2018 [employer]. [address] [place] [address], [place] [address]. All rights reserved. This information is not intended as a substitute for professional medical care. Always follow your healthcare professional's instructions.

## 2020-08-17 ENCOUNTER — OFFICE VISIT (OUTPATIENT)
Dept: FAMILY MEDICINE | Facility: CLINIC | Age: 30
End: 2020-08-17
Payer: COMMERCIAL

## 2020-08-17 VITALS
BODY MASS INDEX: 28.56 KG/M2 | SYSTOLIC BLOOD PRESSURE: 120 MMHG | HEIGHT: 67 IN | HEART RATE: 71 BPM | WEIGHT: 182 LBS | OXYGEN SATURATION: 98 % | DIASTOLIC BLOOD PRESSURE: 80 MMHG | RESPIRATION RATE: 16 BRPM

## 2020-08-17 DIAGNOSIS — N92.6 IRREGULAR PERIODS: ICD-10-CM

## 2020-08-17 DIAGNOSIS — Z12.4 SCREENING FOR MALIGNANT NEOPLASM OF CERVIX: ICD-10-CM

## 2020-08-17 DIAGNOSIS — Z31.69 PRE-CONCEPTION COUNSELING: ICD-10-CM

## 2020-08-17 DIAGNOSIS — Z84.2 FAMILY HISTORY OF ENDOMETRIOSIS: ICD-10-CM

## 2020-08-17 DIAGNOSIS — Z00.00 ROUTINE HISTORY AND PHYSICAL EXAMINATION OF ADULT: Primary | ICD-10-CM

## 2020-08-17 LAB
BASOPHILS # BLD AUTO: 0 10E9/L (ref 0–0.2)
BASOPHILS NFR BLD AUTO: 0.3 %
DIFFERENTIAL METHOD BLD: NORMAL
EOSINOPHIL # BLD AUTO: 0 10E9/L (ref 0–0.7)
EOSINOPHIL NFR BLD AUTO: 0.6 %
ERYTHROCYTE [DISTWIDTH] IN BLOOD BY AUTOMATED COUNT: 12 % (ref 10–15)
ESTRADIOL SERPL-MCNC: 70 PG/ML
FSH SERPL-ACNC: 6 IU/L
HBA1C MFR BLD: 5.2 % (ref 0–5.6)
HCT VFR BLD AUTO: 38.8 %
HCT VFR BLD AUTO: 38.8 % (ref 35–47)
HEMOGLOBIN: 12.9 G/DL (ref 11.7–15.7)
HGB BLD-MCNC: 12.9 G/DL (ref 11.7–15.7)
LYMPHOCYTES # BLD AUTO: 1.8 10E9/L (ref 0.8–5.3)
LYMPHOCYTES NFR BLD AUTO: 27 %
MCH RBC QN AUTO: 30.1 PG (ref 26.5–33)
MCHC RBC AUTO-ENTMCNC: 33.2 G/DL (ref 31.5–36.5)
MCV RBC AUTO: 91 FL (ref 78–100)
MONOCYTES # BLD AUTO: 0.5 10E9/L (ref 0–1.3)
MONOCYTES NFR BLD AUTO: 7.9 %
NEUTROPHILS # BLD AUTO: 4.2 10E9/L (ref 1.6–8.3)
NEUTROPHILS NFR BLD AUTO: 64.2 %
PLATELET # BLD AUTO: 218 10E9/L (ref 150–450)
PLATELET # BLD AUTO: 218 10^9/L
PROLACTIN SERPL-MCNC: 7 UG/L (ref 3–27)
RBC # BLD AUTO: 4.28 10E12/L (ref 3.8–5.2)
TSH SERPL-ACNC: 1.38 MCU/ML
WBC # BLD AUTO: 6.6 10E9/L (ref 4–11)

## 2020-08-17 PROCEDURE — 99213 OFFICE O/P EST LOW 20 MIN: CPT | Mod: 25 | Performed by: PHYSICIAN ASSISTANT

## 2020-08-17 PROCEDURE — 85025 COMPLETE CBC W/AUTO DIFF WBC: CPT | Performed by: PHYSICIAN ASSISTANT

## 2020-08-17 PROCEDURE — 84443 ASSAY THYROID STIM HORMONE: CPT | Performed by: PHYSICIAN ASSISTANT

## 2020-08-17 PROCEDURE — 36415 COLL VENOUS BLD VENIPUNCTURE: CPT | Performed by: PHYSICIAN ASSISTANT

## 2020-08-17 PROCEDURE — 84146 ASSAY OF PROLACTIN: CPT | Performed by: PHYSICIAN ASSISTANT

## 2020-08-17 PROCEDURE — 83036 HEMOGLOBIN GLYCOSYLATED A1C: CPT | Performed by: PHYSICIAN ASSISTANT

## 2020-08-17 PROCEDURE — 87624 HPV HI-RISK TYP POOLED RSLT: CPT | Performed by: PHYSICIAN ASSISTANT

## 2020-08-17 PROCEDURE — 99395 PREV VISIT EST AGE 18-39: CPT | Performed by: PHYSICIAN ASSISTANT

## 2020-08-17 PROCEDURE — 83001 ASSAY OF GONADOTROPIN (FSH): CPT | Performed by: PHYSICIAN ASSISTANT

## 2020-08-17 PROCEDURE — G0145 SCR C/V CYTO,THINLAYER,RESCR: HCPCS | Performed by: PHYSICIAN ASSISTANT

## 2020-08-17 PROCEDURE — 82670 ASSAY OF TOTAL ESTRADIOL: CPT | Performed by: PHYSICIAN ASSISTANT

## 2020-08-17 ASSESSMENT — MIFFLIN-ST. JEOR: SCORE: 1570.24

## 2020-08-17 NOTE — PROGRESS NOTES
SUBJECTIVE:   CC: Poonam Urban is an 30 year old woman who presents for preventive health visit.     Healthy Habits:     Getting at least 3 servings of Calcium per day:  NO    Bi-annual eye exam:  Yes    Dental care twice a year:  Yes    Sleep apnea or symptoms of sleep apnea:  None    Diet:  Other    Frequency of exercise:  2-3 days/week    Duration of exercise:  30-45 minutes    Taking medications regularly:  Not Applicable    Barriers to taking medications:  None    Medication side effects:  Not applicable    PHQ-2 Total Score: 0    Additional concerns today:  Yes      - Patient has irregular periods, once every 2-3 months. Plans on trying to conceive within the next several months and wonders how to time intercourse because of her irregular cycles. Has been on OCP since high school so cannot recall if cycles were irregular off OCP. Reports family hx endometriosis in her mother. Denies heavy bleeding or cramping, unexplained change in weight, abnormal hair growth, skin changes.    Today's PHQ-2 Score:   PHQ-2 ( 1999 [employer]) 8/17/2020   Q1: Little interest or pleasure in doing things 0   Q2: Feeling down, depressed or hopeless 0   PHQ-2 Score 0   Q1: Little interest or pleasure in doing things Not at all   Q2: Feeling down, depressed or hopeless Not at all   PHQ-2 Score 0     Abuse: Current or Past(Physical, Sexual or Emotional)- No  Do you feel safe in your environment? Yes    Social History     Tobacco Use     Smoking status: Never Smoker     Smokeless tobacco: Never Used   Substance Use Topics     Alcohol use: Yes     Alcohol/week: 2.5 - 3.3 standard drinks     Types: 3 - 4 Standard drinks or equivalent per week     If you drink alcohol do you typically have >3 drinks per day or >7 drinks per week? No    Alcohol Use 8/17/2020   Prescreen: >3 drinks/day or >7 drinks/week? No   Prescreen: >3 drinks/day or >7 drinks/week? -       Reviewed orders with patient.  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    Pertinent mammograms are reviewed under the imaging tab.  History of abnormal Pap smear: NO - age 30-65 PAP every 5 years with negative HPV co-testing recommended  PAP / HPV 1/16/2017 12/3/2013   PAP NIL NIL     Reviewed and updated as needed this visit by clinical staff  Tobacco  Allergies  Meds  Problems  Med Hx  Surg Hx  Fam Hx  Soc Hx          Reviewed and updated as needed this visit by Provider  Tobacco  Allergies  Meds  Problems  Med Hx  Surg Hx  Fam Hx            Review of Systems  CONSTITUTIONAL: NEGATIVE for fever, chills, change in weight  INTEGUMENTARU/SKIN: NEGATIVE for worrisome rashes, moles or lesions  EYES: NEGATIVE for vision changes or irritation  ENT: NEGATIVE for ear, mouth and throat problems  RESP: NEGATIVE for significant cough or SOB  BREAST: NEGATIVE for masses, tenderness or discharge  CV: NEGATIVE for chest pain, palpitations or peripheral edema  GI: NEGATIVE for nausea, abdominal pain, heartburn, or change in bowel habits  : as above  MUSCULOSKELETAL: NEGATIVE for  "significant arthralgias or myalgia  NEURO: NEGATIVE for weakness, dizziness or paresthesias  PSYCHIATRIC: NEGATIVE for changes in mood or affect     OBJECTIVE:   /80   Pulse 71   Resp 16   Ht 1.689 m (5' 6.5\")   Wt 82.6 kg (182 lb)   SpO2 98%   BMI 28.94 kg/m    Physical Exam  GENERAL: healthy, alert and no distress  EYES: Eyes grossly normal to inspection, PERRL and conjunctivae and sclerae normal  HENT: ear canals and TM's normal, nose and mouth without ulcers or lesions  NECK: no adenopathy, no asymmetry, masses, or scars and thyroid normal to palpation  RESP: lungs clear to auscultation - no rales, rhonchi or wheezes  BREAST: normal without masses, tenderness or nipple discharge and no palpable axillary masses or adenopathy  CV: regular rate and rhythm, normal S1 S2, no S3 or S4, no murmur, click or rub, no peripheral edema and peripheral pulses strong   (female): normal female external genitalia, normal urethral meatus, vaginal mucosa pink, moist, well rugated, and normal cervix/adnexa/uterus without masses or discharge  MS: no gross musculoskeletal defects noted, no edema  SKIN: no suspicious lesions or rashes  NEURO: Normal strength and tone, mentation intact and speech normal  PSYCH: mentation appears normal, affect normal/bright    Diagnostic Test Results:  none     ASSESSMENT/PLAN:       ICD-10-CM    1. Routine history and physical examination of adult  Z00.00    2. Irregular periods  N92.6 TSH with free T4 reflex     CBC with platelets and differential     Hemoglobin A1c     Follicle stimulating hormone     Estradiol     Lutropin     US Pelvic Complete w Transvaginal     Prolactin   3. Pre-conception counseling  Z31.69    4. Family history of endometriosis  Z84.2 US Pelvic Complete w Transvaginal   5. Screening for malignant neoplasm of cervix  Z12.4 Pap imaged thin layer screen with HPV - recommended age 30 - 65 years (select HPV order below)     HPV High Risk Types DNA Cervical     - " "Discussed further w/u as above for underlying cause of irregular periods. Management pending those results.  - Discussed signs of ovulation and timing of intercourse. Recommend starting basal body temp monitoring +/- urine ovulation tests.   - Discussed healthy pregnancies. Start daily prenatal vitamin.    COUNSELING:  Reviewed preventive health counseling, as reflected in patient instructions    Estimated body mass index is 28.94 kg/m  as calculated from the following:    Height as of this encounter: 1.689 m (5' 6.5\").    Weight as of this encounter: 82.6 kg (182 lb).    Weight management plan: Discussed healthy diet and exercise guidelines     reports that she has never smoked. She has never used smokeless tobacco.      Counseling Resources:  ATP IV Guidelines  Pooled Cohorts Equation Calculator  Breast Cancer Risk Calculator  FRAX Risk Assessment  ICSI Preventive Guidelines  Dietary Guidelines for Americans, 2010  USDA's MyPlate  ASA Prophylaxis  Lung CA Screening    Yue Marinelli PA-C  [facility]  "

## 2020-08-17 NOTE — PATIENT INSTRUCTIONS
To schedule your imaging:    [facility] ([facility])  [facility]  Call [phone]        Preventive Health Recommendations  Female Ages 26 - 39  Yearly exam:   See your health care provider every year in order to    Review health changes.     Discuss preventive care.      Review your medicines if you your doctor has prescribed any.    Until age 30: Get a Pap test every three years (more often if you have had an abnormal result).    After age 30: Talk to your doctor about whether you should have a Pap test every 3 years or have a Pap test with HPV screening every 5 years.   You do not need a Pap test if your uterus was removed (hysterectomy) and you have not had cancer.  You should be tested each year for STDs (sexually transmitted diseases), if you're at risk.   Talk to your provider about how often to have your cholesterol checked.  If you are at risk for diabetes, you should have a diabetes test (fasting glucose).  Shots: Get a flu shot each year. Get a tetanus shot every 10 years.   Nutrition:     Eat at least 5 servings of fruits and vegetables each day.    Eat whole-grain bread, whole-wheat pasta and brown rice instead of white grains and rice.    Get adequate Calcium and Vitamin D.     Lifestyle    Exercise at least 150 minutes a week (30 minutes a day, 5 days of the week). This will help you control your weight and prevent disease.    Limit alcohol to one drink per day.    No smoking.     Wear sunscreen to prevent skin cancer.    See your dentist every six months for an exam and cleaning.    Patient Education     What Is Prenatal Care?     Your healthcare provider will guide you and your partner through pregnancy.   Before becoming pregnant, you may have adopted good health habits to prepare for your baby. But if you didn t, start today. One of the first steps is learning how to take care of yourself. See your healthcare provider as soon as you think you may be pregnant. Then,  continue prenatal care throughout your pregnancy.  Prenatal care helps you have a healthy baby  During prenatal care:    Your healthcare provider evaluates the health of your pregnancy. Your healthcare provider will calculate a  due date  that gives an estimate of the delivery of your baby. Many women give birth between 38 and 41 weeks of pregnancy. Your due date is determined by counting 40 weeks from the first day of your last menstrual period.    The progress of your pregnancy is checked. This includes your baby s growth, fetal heart rate, changes in your weight and blood pressure, and your overall health and comfort.    Your healthcare provider may find new concerns and manage existing ones before problems happen.    Your healthcare provider will check lab work through blood and urine.    Your healthcare provider will discuss normal changes that happen during pregnancy, changes that may not be normal, and appropriate lifestyle changes.    Your healthcare provider will answer your questions and help you prepare for labor and delivery of your baby.  You are part of a team  When you re pregnant, you re part of a team that includes you, your baby, and your healthcare provider. Your team also may include a partner or a main support person. He or she could be a loved one, like a spouse, a family member, or a friend. As you work toward giving your baby a healthy start, rely on your team members for support.  It s not too late to start good habits  What matters most is protecting your baby from this moment on. If you smoke, drink alcohol, or use drugs, now is the time to stop. If you need help, talk with your healthcare provider:    Smoking increases the risk of losing your baby or having a low-birth-weight baby. If you smoke, quit now.    Alcohol and drugs have been linked with miscarriage, birth defects, intellectual disability, and low birth weight. Avoid alcohol and drugs.    Eat a healthy diet. This helps keep you  and your baby strong and healthy. Follow your healthcare provider's instructions for nutrition. Also stay within the guidelines you are given for healthy weight gain.    Take 400 micrograms to 800 micrograms (400 mcg to 800 mcg or 0.4 mg to 0.8 mg) of folic acid every day for at least 3 months before getting pregnant to lower your risk of some birth defects of the brain and spine. You can get folic acid from some foods. It is hard to get all of the folic acid you will need from foods alone. Talk with your healthcare provider about taking a folic acid supplement.    Regular exercise will help you stay fit and feel good during pregnancy. It can also help prevent or minimize back pain. Be sure to talk with your healthcare provider about how to exercise safely during pregnancy.    If you have a medical condition, be sure it is under control. Some conditions include asthma, diabetes, depression, high blood pressure, obesity, thyroid disease, or epilepsy. Be sure your vaccines are up to date.  Date Last Reviewed: 10/1/2017    7898-9202 [email or web address]. [address] [place] [address], [place] [address]. All rights reserved. This information is not intended as a substitute for professional medical care. Always follow your healthcare professional's instructions.

## 2020-08-18 LAB — TSH SERPL DL<=0.005 MIU/L-ACNC: 1.38 MU/L (ref 0.4–4)

## 2020-08-19 ENCOUNTER — HOSPITAL ENCOUNTER (OUTPATIENT)
Dept: ULTRASOUND IMAGING | Facility: CLINIC | Age: 30
Discharge: HOME | End: 2020-08-19
Attending: PHYSICIAN ASSISTANT | Admitting: PHYSICIAN ASSISTANT
Payer: COMMERCIAL

## 2020-08-19 ENCOUNTER — MYC MEDICAL ADVICE (OUTPATIENT)
Dept: FAMILY MEDICINE | Facility: CLINIC | Age: 30
End: 2020-08-19

## 2020-08-19 DIAGNOSIS — E28.2 PCOS (POLYCYSTIC OVARIAN SYNDROME): ICD-10-CM

## 2020-08-19 DIAGNOSIS — Z84.2 FAMILY HISTORY OF ENDOMETRIOSIS: ICD-10-CM

## 2020-08-19 DIAGNOSIS — N92.6 IRREGULAR PERIODS: ICD-10-CM

## 2020-08-19 PROCEDURE — 76830 TRANSVAGINAL US NON-OB: CPT

## 2020-08-19 NOTE — RESULT ENCOUNTER NOTE
Cr Levin    I just wanted to let you know that your imaging results have been reviewed and are attached.    - Your ultrasound showed findings consistent with polycystic ovaries. The imaging result along with your history of oligomenorrhea (decreased periods) confirms a diagnosis of Polycystic Ovarian Syndrome (PCOS). This means you will likely have difficulty conceiving (difficulty, but not impossible!) compared to someone without PCOS. Since you are planning on trying to conceive soon, I recommend we have you meet with our specialists at [facility] [place]; they will be able find the best way to increase your fertility. I have placed a referral; please call [phone] to schedule your first appointment.    Please let me know if you have any questions.    Sincerely,    Yue Marinelli PA-C    [facility] [place]  [address]  [place] [address]  [phone] (p)

## 2020-08-21 LAB
COPATH REPORT: NORMAL
PAP: NORMAL

## 2020-08-24 LAB
FINAL DIAGNOSIS: NORMAL
HPV HR 12 DNA CVX QL NAA+PROBE: NEGATIVE
HPV16 DNA SPEC QL NAA+PROBE: NEGATIVE
HPV18 DNA SPEC QL NAA+PROBE: NEGATIVE
SPECIMEN DESCRIPTION: NORMAL
SPECIMEN SOURCE CVX/VAG CYTO: NORMAL

## 2020-09-08 NOTE — PROGRESS NOTES
SUBJECTIVE:                                                   Poonam Urban is a 30 year old female who presents to clinic today for the following health issue(s):  Patient presents with:  Follow Up: PCOS      Additional information: Pt went to [facility] last month and was diagnosed with POS    HPI:  The patient is a 30-year-old who is seen at this time for irregular menstrual cycles.  She took birth control pills for 10 years for both contraception and facial acne.  She has been off pills for 12 months and has cycled every 2 to 3 months.  She has not done any ovulatory testing.  She has no history of any hypertension or prediabetes.  She has not had any surgeries.    Patient's last menstrual period was 2020 (exact date)..     Patient is sexually active, .  Using none for contraception.    reports that she has never smoked. She has never used smokeless tobacco.    STD testing offered?  Declined    Health maintenance updated:  no    Today's PHQ-2 Score:   PHQ-2 (  [employer]) 2020   Q1: Little interest or pleasure in doing things 0   Q2: Feeling down, depressed or hopeless 0   PHQ-2 Score 0   Q1: Little interest or pleasure in doing things Not at all   Q2: Feeling down, depressed or hopeless Not at all   PHQ-2 Score 0     Today's PHQ-9 Score: No flowsheet data found.  Today's SONIA-7 Score: No flowsheet data found.    Problem list and histories reviewed & adjusted, as indicated.  Additional history: as documented.    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     PCOS (polycystic ovarian syndrome)     Past Surgical History:   Procedure Laterality Date     EYE SURGERY       LASIK BILATERAL        Social History     Tobacco Use     Smoking status: Never Smoker     Smokeless tobacco: Never Used   Substance Use Topics     Alcohol use: Yes     Alcohol/week: 2.5 - 3.3 standard drinks     Types: 3 - 4 Standard drinks or  "equivalent per week      Problem (# of Occurrences) Relation (Name,Age of Onset)    No Known Problems (6) Sister, Brother, Maternal Grandmother, Maternal Grandfather, Paternal Grandmother, Other    Other Cancer (1) Paternal Grandfather       Negative family history of: Cancer            No current outpatient medications on file.     No current facility-administered medications for this visit.      Allergies   Allergen Reactions     Milk Protein Extract        ROS:  12 point review of systems negative other than symptoms noted below or in the HPI.  Genitourinary: Irregular Menses  No urinary frequency or dysuria, bladder or kidney problems      OBJECTIVE:     /84   Pulse 78   Ht 1.69 m (5' 6.54\")   Wt 84.4 kg (186 lb)   LMP 09/08/2020 (Exact Date)   BMI 29.54 kg/m    Body mass index is 29.54 kg/m .    Exam:  Constitutional:  Appearance: Well nourished, well developed alert, in no acute distress  Gastrointestinal:  Abdominal Examination:  Abdomen nontender to palpation, tone normal without rigidity or guarding, no masses present, umbilicus without lesions; Liver/Spleen:  No hepatomegaly present, liver nontender to palpation; Hernias:  No hernias present  Lymphatic: Lymph Nodes:  No other lymphadenopathy present  Skin: General Inspection:  No rashes present, no lesions present, no areas of discoloration.  Neurologic:  Mental Status:  Oriented X3.  Normal strength and tone, sensory exam grossly normal, mentation intact and speech normal.    Psychiatric:  Mentation appears normal and affect normal/bright.  Pelvic Exam:  External Genitalia:     Normal appearance for age, no discharge present, no tenderness present, no inflammatory lesions present, color normal  Vagina:     Normal vaginal vault without central or paravaginal defects, no discharge present, no inflammatory lesions present, no masses present  Bladder:     Nontender to palpation  Urethra:   Urethral Body:  Urethra palpation normal, urethra " structural support normal   Urethral Meatus:  No erythema or lesions present  Cervix:     Appearance healthy, no lesions present, nontender to palpation, no bleeding present  Uterus:     Uterus: firm, normal sized and nontender, midplane in position.   Adnexa:     No adnexal tenderness present, no adnexal masses present  Perineum:     Perineum within normal limits, no evidence of trauma, no rashes or skin lesions present  Anus:     Anus within normal limits, no hemorrhoids present  Inguinal Lymph Nodes:     No lymphadenopathy present  Pubic Hair:     Normal pubic hair distribution for age  Genitalia and Groin:     No rashes present, no lesions present, no areas of discoloration, no masses present       In-Clinic Test Results: Ultrasound dated 8/19 shows a normal uterus.  The endometrium was 7.5 mm.  Both ovaries had at least 25 subcapsular follicles measuring 2 to 9 mm.      ASSESSMENT/PLAN:                                                        30-year-old patient with irregular cycles and obvious anovulation.  Ultrasound diagnosis of polycystic ovarian syndrome is the most accurate way to diagnose this condition.  We have discussed the initiation of Glucophage to try to break the insulin resistance cycle.        Miguel Ahmadi MD  [facility] [place]

## 2020-09-09 ENCOUNTER — OFFICE VISIT (OUTPATIENT)
Dept: OBGYN | Facility: CLINIC | Age: 30
End: 2020-09-09
Payer: COMMERCIAL

## 2020-09-09 VITALS
BODY MASS INDEX: 29.19 KG/M2 | DIASTOLIC BLOOD PRESSURE: 84 MMHG | SYSTOLIC BLOOD PRESSURE: 102 MMHG | WEIGHT: 186 LBS | HEART RATE: 78 BPM | HEIGHT: 67 IN

## 2020-09-09 DIAGNOSIS — E28.2 PCOS (POLYCYSTIC OVARIAN SYNDROME): Primary | ICD-10-CM

## 2020-09-09 PROCEDURE — 99213 OFFICE O/P EST LOW 20 MIN: CPT | Performed by: OBSTETRICS & GYNECOLOGY

## 2020-09-09 SDOH — HEALTH STABILITY: MENTAL HEALTH: HOW MANY STANDARD DRINKS CONTAINING ALCOHOL DO YOU HAVE ON A TYPICAL DAY?: 1 OR 2

## 2020-09-09 SDOH — HEALTH STABILITY: MENTAL HEALTH: HOW OFTEN DO YOU HAVE 6 OR MORE DRINKS ON ONE OCCASION?: NEVER

## 2020-09-09 SDOH — HEALTH STABILITY: MENTAL HEALTH: HOW OFTEN DO YOU HAVE A DRINK CONTAINING ALCOHOL?: 2-3 TIMES A WEEK

## 2020-09-09 ASSESSMENT — ANXIETY QUESTIONNAIRES
2. NOT BEING ABLE TO STOP OR CONTROL WORRYING: NOT AT ALL
IF YOU CHECKED OFF ANY PROBLEMS ON THIS QUESTIONNAIRE, HOW DIFFICULT HAVE THESE PROBLEMS MADE IT FOR YOU TO DO YOUR WORK, TAKE CARE OF THINGS AT HOME, OR GET ALONG WITH OTHER PEOPLE: NOT DIFFICULT AT ALL
1. FEELING NERVOUS, ANXIOUS, OR ON EDGE: NOT AT ALL
3. WORRYING TOO MUCH ABOUT DIFFERENT THINGS: NOT AT ALL
7. FEELING AFRAID AS IF SOMETHING AWFUL MIGHT HAPPEN: NOT AT ALL
5. BEING SO RESTLESS THAT IT IS HARD TO SIT STILL: NOT AT ALL
GAD7 TOTAL SCORE: 0
6. BECOMING EASILY ANNOYED OR IRRITABLE: NOT AT ALL

## 2020-09-09 ASSESSMENT — MIFFLIN-ST. JEOR: SCORE: 1588.93

## 2020-09-09 ASSESSMENT — PATIENT HEALTH QUESTIONNAIRE - PHQ9
5. POOR APPETITE OR OVEREATING: NOT AT ALL
SUM OF ALL RESPONSES TO PHQ QUESTIONS 1-9: 0

## 2020-09-10 ASSESSMENT — ANXIETY QUESTIONNAIRES: GAD7 TOTAL SCORE: 0

## 2020-10-29 ENCOUNTER — MYC MEDICAL ADVICE (OUTPATIENT)
Dept: OBGYN | Facility: CLINIC | Age: 30
End: 2020-10-29

## 2020-10-29 NOTE — TELEPHONE ENCOUNTER
Routing pt mychart message to provider to advise.    Brittany Echavarria RN on 10/29/2020 at 12:01 PM

## 2020-10-30 NOTE — TELEPHONE ENCOUNTER
I would recommend stopping the metformin now that she is pregnant and the additional supplements other than the prenatal vitamin which should have folate in it already.  We don't have data showing safety of many things in pregnancy so we limit it to the essentials.     She was on the metformin due to pcos and trying to conceive. Her A1C is normal.

## 2020-11-09 DIAGNOSIS — O36.80X0 PREGNANCY WITH INCONCLUSIVE FETAL VIABILITY: Primary | ICD-10-CM

## 2020-11-10 ENCOUNTER — PRENATAL OFFICE VISIT (OUTPATIENT)
Dept: NURSING | Facility: CLINIC | Age: 30
End: 2020-11-10
Payer: COMMERCIAL

## 2020-11-10 DIAGNOSIS — Z34.81 SUPERVISION OF NORMAL INTRAUTERINE PREGNANCY IN MULTIGRAVIDA IN FIRST TRIMESTER: Primary | ICD-10-CM

## 2020-11-10 PROCEDURE — 99207 PR NO CHARGE NURSE ONLY: CPT

## 2020-11-10 NOTE — PROGRESS NOTES
Pt has NOB scheduled with ultrasound. Needed order to be placed. Future ultrasound order placed and linked with appt. Closing encounter.   Marisela Boateng RN on 11/9/2020 at 7:02 PM

## 2020-11-10 NOTE — PROGRESS NOTES
SUBJECTIVE:     HPI:    This is a 30 year old female patient,  who presents for her first obstetrical visit.    NADIA: 6/15/2021, by Last Menstrual Period.  She is 9w0d weeks.  Her cycles are irregular.  Her last menstrual period was normal.   Since her LMP, she has experienced  nausea, fatigue and breast tenderness).   She denies emesis, abdominal pain, headache, loss of appetite, vaginal discharge, dysuria, pelvic pain, urinary urgency, lightheadedness, urinary frequency, vaginal bleeding, hemorrhoids and constipation.    Additional History: No significant hx.    Have you travelled during the pregnancy?Yes, If yes, where? [place] (parent live there)  If yes, who?  Self and Spouse  Have your sexual partner(s) travelled during the pregnancy?Yes, If yes, where? [place] for [employer]    HISTORY:   Planned Pregnancy: Yes  Marital Status:   Occupation: [employer], visits building sites  Living in Household: Spouse    Past History:  Her past medical history   Past Medical History:   Diagnosis Date     Menarche 13 years old   .      She has a history of  N/A    Since her last LMP she denies use of alcohol, tobacco and street drugs.    Past medical, surgical, social and family history were reviewed and updated in [facility].      No current outpatient medications on file.     No current facility-administered medications for this visit.        ROS:   12 point review of systems negative other than symptoms noted below or in the HPI.  Constitutional: Fatigue  Breast: Tenderness  Gastrointestinal: Nausea    Nurse phone visit completed. Prenatal book and folder (containing standard educational hand-outs and brochures) will be given at next appointment to patient. Information in folder reviewed over the phone. Questions answered. Brochure given on optional screening available to assess chromosomal anomalies. Pt desires NIPT. Pt advised to call the clinic if she has any questions or concerns related to her pregnancy.  Prenatal labs future ordered. New prenatal visit scheduled on 11/27/2020 with Dr. Gusman.      Lab Results   Component Value Date    PAP NIL 08/17/2020     PHQ-9 score:    PHQ 11/9/2020   PHQ-9 Total Score 1   Q9: Thoughts of better off dead/self-harm past 2 weeks Not at all       SNOIA-7 SCORE 9/9/2020 11/9/2020   Total Score - 0 (minimal anxiety)   Total Score 0 0     Patient supplied answers from flow sheet for:  Prenatal OB Questionnaire.     Erin Andres RN on 11/10/2020 at 10:03 AM

## 2020-11-24 ENCOUNTER — PRENATAL OFFICE VISIT (OUTPATIENT)
Dept: OBGYN | Facility: CLINIC | Age: 30
End: 2020-11-24
Payer: COMMERCIAL

## 2020-11-24 ENCOUNTER — ANCILLARY PROCEDURE (OUTPATIENT)
Dept: ULTRASOUND IMAGING | Facility: CLINIC | Age: 30
End: 2020-11-24
Payer: COMMERCIAL

## 2020-11-24 VITALS
SYSTOLIC BLOOD PRESSURE: 124 MMHG | WEIGHT: 181 LBS | HEIGHT: 67 IN | DIASTOLIC BLOOD PRESSURE: 70 MMHG | BODY MASS INDEX: 28.41 KG/M2

## 2020-11-24 DIAGNOSIS — O36.80X0 PREGNANCY WITH INCONCLUSIVE FETAL VIABILITY: ICD-10-CM

## 2020-11-24 DIAGNOSIS — Z23 NEED FOR PROPHYLACTIC VACCINATION AND INOCULATION AGAINST INFLUENZA: Primary | ICD-10-CM

## 2020-11-24 DIAGNOSIS — Z34.81 SUPERVISION OF NORMAL INTRAUTERINE PREGNANCY IN MULTIGRAVIDA IN FIRST TRIMESTER: ICD-10-CM

## 2020-11-24 LAB
ABO + RH BLD: NORMAL
ABO + RH BLD: NORMAL
ALBUMIN UR-MCNC: NEGATIVE MG/DL
APPEARANCE UR: CLEAR
BILIRUB UR QL STRIP: NEGATIVE
BLD GP AB SCN SERPL QL: NORMAL
BLOOD BANK CMNT PATIENT-IMP: NORMAL
COLOR UR AUTO: YELLOW
ERYTHROCYTE [DISTWIDTH] IN BLOOD BY AUTOMATED COUNT: 12.3 % (ref 10–15)
GLUCOSE UR STRIP-MCNC: NEGATIVE MG/DL
HCT VFR BLD AUTO: 34.6 % (ref 35–47)
HGB BLD-MCNC: 11.8 G/DL (ref 11.7–15.7)
HGB UR QL STRIP: NEGATIVE
KETONES UR STRIP-MCNC: NEGATIVE MG/DL
LEUKOCYTE ESTERASE UR QL STRIP: NEGATIVE
MCH RBC QN AUTO: 30.4 PG (ref 26.5–33)
MCHC RBC AUTO-ENTMCNC: 34.1 G/DL (ref 31.5–36.5)
MCV RBC AUTO: 89 FL (ref 78–100)
NITRATE UR QL: NEGATIVE
PH UR STRIP: 7 PH (ref 5–7)
PLATELET # BLD AUTO: 240 10E9/L (ref 150–450)
RBC # BLD AUTO: 3.88 10E12/L (ref 3.8–5.2)
SOURCE: NORMAL
SP GR UR STRIP: 1.02 (ref 1–1.03)
SPECIMEN EXP DATE BLD: NORMAL
UROBILINOGEN UR STRIP-ACNC: 1 EU/DL (ref 0.2–1)
WBC # BLD AUTO: 8.3 10E9/L (ref 4–11)

## 2020-11-24 PROCEDURE — 90686 IIV4 VACC NO PRSV 0.5 ML IM: CPT | Performed by: OBSTETRICS & GYNECOLOGY

## 2020-11-24 PROCEDURE — 86901 BLOOD TYPING SEROLOGIC RH(D): CPT | Performed by: OBSTETRICS & GYNECOLOGY

## 2020-11-24 PROCEDURE — 90471 IMMUNIZATION ADMIN: CPT | Performed by: OBSTETRICS & GYNECOLOGY

## 2020-11-24 PROCEDURE — 36415 COLL VENOUS BLD VENIPUNCTURE: CPT | Performed by: OBSTETRICS & GYNECOLOGY

## 2020-11-24 PROCEDURE — 99N1100 PR STATISTIC VERIFI PRENATAL TRISOMY 21,18,13: Mod: 90 | Performed by: OBSTETRICS & GYNECOLOGY

## 2020-11-24 PROCEDURE — 87086 URINE CULTURE/COLONY COUNT: CPT | Performed by: OBSTETRICS & GYNECOLOGY

## 2020-11-24 PROCEDURE — 87340 HEPATITIS B SURFACE AG IA: CPT | Performed by: OBSTETRICS & GYNECOLOGY

## 2020-11-24 PROCEDURE — 86762 RUBELLA ANTIBODY: CPT | Performed by: OBSTETRICS & GYNECOLOGY

## 2020-11-24 PROCEDURE — 86780 TREPONEMA PALLIDUM: CPT | Mod: 90 | Performed by: OBSTETRICS & GYNECOLOGY

## 2020-11-24 PROCEDURE — 76817 TRANSVAGINAL US OBSTETRIC: CPT | Performed by: OBSTETRICS & GYNECOLOGY

## 2020-11-24 PROCEDURE — 85027 COMPLETE CBC AUTOMATED: CPT | Performed by: OBSTETRICS & GYNECOLOGY

## 2020-11-24 PROCEDURE — 87389 HIV-1 AG W/HIV-1&-2 AB AG IA: CPT | Performed by: OBSTETRICS & GYNECOLOGY

## 2020-11-24 PROCEDURE — 86900 BLOOD TYPING SEROLOGIC ABO: CPT | Performed by: OBSTETRICS & GYNECOLOGY

## 2020-11-24 PROCEDURE — 81003 URINALYSIS AUTO W/O SCOPE: CPT | Performed by: OBSTETRICS & GYNECOLOGY

## 2020-11-24 PROCEDURE — 99000 SPECIMEN HANDLING OFFICE-LAB: CPT | Performed by: OBSTETRICS & GYNECOLOGY

## 2020-11-24 PROCEDURE — 86850 RBC ANTIBODY SCREEN: CPT | Performed by: OBSTETRICS & GYNECOLOGY

## 2020-11-24 PROCEDURE — 99207 PR FIRST OB VISIT: CPT | Performed by: OBSTETRICS & GYNECOLOGY

## 2020-11-24 RX ORDER — VITAMIN B COMPLEX
TABLET ORAL DAILY
COMMUNITY
End: 2022-12-06

## 2020-11-24 RX ORDER — PRENATAL VIT/IRON FUM/FOLIC AC 27MG-0.8MG
1 TABLET ORAL DAILY
COMMUNITY

## 2020-11-24 ASSESSMENT — MIFFLIN-ST. JEOR: SCORE: 1565.7

## 2020-11-24 NOTE — PROGRESS NOTES
HPI:     This is a 30 year old female patient,  who presents for her first obstetrical visit.     NADIA: 6/15/2021, by Last Menstrual Period.  She is 11w0d weeks.  Her cycles are irregular.  Her last menstrual period was normal.   Since her LMP, she has experienced  nausea, fatigue and breast tenderness).   She denies emesis, abdominal pain, headache, loss of appetite, vaginal discharge, dysuria, pelvic pain, urinary urgency, lightheadedness, urinary frequency, vaginal bleeding, hemorrhoids and constipation.     Additional History: No significant hx.  Working at home- [employer]     Have you travelled during the pregnancy?Yes, If yes, where? [place] (parent live there)  If yes, who?  Self and Spouse  Have your sexual partner(s) travelled during the pregnancy?Yes, If yes, where? [place] for [employer]     HISTORY:   Planned Pregnancy: Yes  Marital Status:   Occupation: [employer], visits building sites  Living in Household: Spouse     Past History:  Her past medical history   Past Medical History        Past Medical History:   Diagnosis Date     Menarche 13 years old      .       She has a history of  N/A     Since her last LMP she denies use of alcohol, tobacco and street drugs.     Past medical, surgical, social and family history were reviewed and updated in [facility].        No current outpatient medications on file.      No current facility-administered medications for this visit.          ROS:   12 point review of systems negative other than symptoms noted below or in the HPI.  Constitutional: Fatigue  Breast: Tenderness  Gastrointestinal: Nausea     Nurse phone visit completed. Prenatal book and folder (containing standard educational hand-outs and brochures) will be given at next appointment to patient. Information in folder reviewed over the phone. Questions answered. Brochure given on optional screening available to assess chromosomal anomalies. Pt desires NIPT. Pt advised to call the clinic if  "she has any questions or concerns related to her pregnancy. Prenatal labs future ordered. New prenatal visit scheduled on 2020 with Dr. Gusman.              Lab Results   Component Value Date     PAP NIL 2020      PHQ-9 score:    PHQ 2020   PHQ-9 Total Score 1   Q9: Thoughts of better off dead/self-harm past 2 weeks Not at all         SONIA-7 SCORE 2020   Total Score - 0 (minimal anxiety)   Total Score 0 0      Patient supplied answers from flow sheet for:  Prenatal OB Questionnaire.     Erin Andres RN on 11/10/2020 at 10:03 Dr. Gerard        OBJECTIVE:     EXAM:  /70   Ht 1.689 m (5' 6.5\")   Wt 82.1 kg (181 lb)   LMP 2020 (Exact Date)   Breastfeeding No   BMI 28.78 kg/m   Body mass index is 28.78 kg/m .    GENERAL: healthy, alert and no distress  NEURO: Normal strength and tone, mentation intact and speech normal  PSYCH: mentation appears normal, affect normal/bright    ASSESSMENT/PLAN:       ICD-10-CM    1. Supervision of normal intrauterine pregnancy in multigravida in first trimester  Z34.81        30 year old , On 2020 patient is 11w0d weeks of pregnancy with NADIA of 6/15/2021, by Last Menstrual Period    Discussed as follows:genetic screening  covid issues  Mask use  20 week fetal ultrasound for anatomy    Counseling given:   - Follow up in 4-6 weeks for return OB visit.  - Recommended weight gain for pregnancy: 25-35 lbs.         PLAN/PATIENT INSTRUCTIONS:    Labs and inatal today  16 week visit for afp in 5 wks     Rebekah Gusman MD         "

## 2020-11-25 LAB
BACTERIA SPEC CULT: NORMAL
HBV SURFACE AG SERPL QL IA: NONREACTIVE
HIV 1+2 AB+HIV1 P24 AG SERPL QL IA: NONREACTIVE
Lab: NORMAL
RUBV IGG SERPL IA-ACNC: 22 IU/ML
SPECIMEN SOURCE: NORMAL
T PALLIDUM AB SER QL: NONREACTIVE

## 2020-11-30 LAB — LAB SCANNED RESULT: NORMAL

## 2020-12-01 ENCOUNTER — TELEPHONE (OUTPATIENT)
Dept: OBGYN | Facility: CLINIC | Age: 30
End: 2020-12-01

## 2020-12-01 NOTE — TELEPHONE ENCOUNTER
Innatal results: Negative  Fetal Fraction: 5 %    TEST RESULT INTERPRETATION   Chromosome 21 No aneuploidy detected  Results consistent with two copies of chromosome 21   Chromosome 18 No aneuploidy detected  Results consistent with two copies of chromosome 18   Chromosome 13 No aneuploidy detected  Results consistent with two copies of chromosome 13   Sex Chromosome No aneuploidy detected  Results consistent with two sex chromosomes:  female     Results received from [facility] testing in [facility] triage.    Testing done:  Innatal Prenatal Screen    Action:  Spoke to patient and gave NORMAL results and routed to provider.     Gender:  Gender revealed to the patient on the phone. The gender is female    Pt verbalized understanding, in agreement with plan, and voiced no further questions.    Mora Pastrana RN

## 2021-01-06 ENCOUNTER — PRENATAL OFFICE VISIT (OUTPATIENT)
Dept: OBGYN | Facility: CLINIC | Age: 31
End: 2021-01-06
Payer: COMMERCIAL

## 2021-01-06 VITALS — DIASTOLIC BLOOD PRESSURE: 70 MMHG | WEIGHT: 182 LBS | BODY MASS INDEX: 28.94 KG/M2 | SYSTOLIC BLOOD PRESSURE: 126 MMHG

## 2021-01-06 DIAGNOSIS — Z34.82 SUPERVISION OF NORMAL INTRAUTERINE PREGNANCY IN MULTIGRAVIDA IN SECOND TRIMESTER: ICD-10-CM

## 2021-01-06 DIAGNOSIS — Z3A.17 17 WEEKS GESTATION OF PREGNANCY: Primary | ICD-10-CM

## 2021-01-06 PROCEDURE — 99000 SPECIMEN HANDLING OFFICE-LAB: CPT | Performed by: OBSTETRICS & GYNECOLOGY

## 2021-01-06 PROCEDURE — 36415 COLL VENOUS BLD VENIPUNCTURE: CPT | Performed by: OBSTETRICS & GYNECOLOGY

## 2021-01-06 PROCEDURE — 99207 PR PRENATAL VISIT: CPT | Performed by: OBSTETRICS & GYNECOLOGY

## 2021-01-06 PROCEDURE — 82105 ALPHA-FETOPROTEIN SERUM: CPT | Mod: 90 | Performed by: OBSTETRICS & GYNECOLOGY

## 2021-01-06 NOTE — PROGRESS NOTES
Doing well today.  Feeling really good  No nausea or fatigue --feels good enough that sometimes forgets she is pregnant  No vb/spotting/cramping  No bowel/bladder issues  Normal innatal; will check AFP today  RTC 3-4wks --anatomy us at that time; order placed

## 2021-01-08 LAB
# FETUSES US: NORMAL
# FETUSES: NORMAL
AFP ADJ MOM AMN: 1.05
AFP SERPL-MCNC: 35 NG/ML
AGE - REPORTED: 30.8 YR
CURRENT SMOKER: NO
CURRENT SMOKER: NO
DIABETES STATUS PATIENT: NO
FAMILY MEMBER DISEASES HX: NO
FAMILY MEMBER DISEASES HX: NO
GA METHOD: NORMAL
GA METHOD: NORMAL
GA: NORMAL WK
IDDM PATIENT QL: NO
INTEGRATED SCN PATIENT-IMP: NORMAL
LMP START DATE: NORMAL
MONOCHORIONIC TWINS: NO
SERVICE CMNT-IMP: NO
SPECIMEN DRAWN SERPL: NORMAL
VALPROIC/CARBAMAZEPINE STATUS: NO
WEIGHT UNITS: NORMAL

## 2021-02-04 ENCOUNTER — ANCILLARY PROCEDURE (OUTPATIENT)
Dept: ULTRASOUND IMAGING | Facility: CLINIC | Age: 31
End: 2021-02-04
Attending: OBSTETRICS & GYNECOLOGY
Payer: COMMERCIAL

## 2021-02-04 ENCOUNTER — PRENATAL OFFICE VISIT (OUTPATIENT)
Dept: OBGYN | Facility: CLINIC | Age: 31
End: 2021-02-04
Attending: OBSTETRICS & GYNECOLOGY
Payer: COMMERCIAL

## 2021-02-04 VITALS — WEIGHT: 183 LBS | BODY MASS INDEX: 29.09 KG/M2 | DIASTOLIC BLOOD PRESSURE: 60 MMHG | SYSTOLIC BLOOD PRESSURE: 118 MMHG

## 2021-02-04 DIAGNOSIS — Z34.82 SUPERVISION OF NORMAL INTRAUTERINE PREGNANCY IN MULTIGRAVIDA IN SECOND TRIMESTER: ICD-10-CM

## 2021-02-04 DIAGNOSIS — Z3A.21 21 WEEKS GESTATION OF PREGNANCY: Primary | ICD-10-CM

## 2021-02-04 DIAGNOSIS — Z34.81 SUPERVISION OF NORMAL INTRAUTERINE PREGNANCY IN MULTIGRAVIDA IN FIRST TRIMESTER: ICD-10-CM

## 2021-02-04 PROCEDURE — 99207 PR PRENATAL VISIT: CPT | Performed by: OBSTETRICS & GYNECOLOGY

## 2021-02-04 PROCEDURE — 76805 OB US >/= 14 WKS SNGL FETUS: CPT | Performed by: OBSTETRICS & GYNECOLOGY

## 2021-02-04 NOTE — PROGRESS NOTES
Doing well today.  +FM  No ctx/cramping/vb/lof  Overall feeling well  Anatomy us today wnl, BR, EFW 398g, post placenta, izzy wnl  RTC 4wks

## 2021-03-09 ENCOUNTER — PRENATAL OFFICE VISIT (OUTPATIENT)
Dept: OBGYN | Facility: CLINIC | Age: 31
End: 2021-03-09
Payer: COMMERCIAL

## 2021-03-09 VITALS — BODY MASS INDEX: 29.73 KG/M2 | DIASTOLIC BLOOD PRESSURE: 68 MMHG | WEIGHT: 187 LBS | SYSTOLIC BLOOD PRESSURE: 112 MMHG

## 2021-03-09 DIAGNOSIS — Z3A.26 26 WEEKS GESTATION OF PREGNANCY: Primary | ICD-10-CM

## 2021-03-09 DIAGNOSIS — Z34.82 SUPERVISION OF NORMAL INTRAUTERINE PREGNANCY IN MULTIGRAVIDA IN SECOND TRIMESTER: ICD-10-CM

## 2021-03-09 PROCEDURE — 99207 PR PRENATAL VISIT: CPT | Performed by: OBSTETRICS & GYNECOLOGY

## 2021-03-09 NOTE — PROGRESS NOTES
Doing well today.  +FM  No ctx/cramping/vb/spotting  No bowel/bladder issues today  RTC 2wks --glucola, hgb and Tdap at that time

## 2021-03-16 DIAGNOSIS — Z34.83 SUPERVISION OF NORMAL INTRAUTERINE PREGNANCY IN MULTIGRAVIDA IN THIRD TRIMESTER: Primary | ICD-10-CM

## 2021-03-16 DIAGNOSIS — Z23 NEED FOR TDAP VACCINATION: ICD-10-CM

## 2021-03-24 ENCOUNTER — PRENATAL OFFICE VISIT (OUTPATIENT)
Dept: OBGYN | Facility: CLINIC | Age: 31
End: 2021-03-24
Payer: COMMERCIAL

## 2021-03-24 VITALS — WEIGHT: 187.2 LBS | BODY MASS INDEX: 29.76 KG/M2 | DIASTOLIC BLOOD PRESSURE: 68 MMHG | SYSTOLIC BLOOD PRESSURE: 114 MMHG

## 2021-03-24 DIAGNOSIS — Z34.83 SUPERVISION OF NORMAL INTRAUTERINE PREGNANCY IN MULTIGRAVIDA IN THIRD TRIMESTER: ICD-10-CM

## 2021-03-24 DIAGNOSIS — Z3A.28 28 WEEKS GESTATION OF PREGNANCY: ICD-10-CM

## 2021-03-24 DIAGNOSIS — Z34.83 SUPERVISION OF NORMAL INTRAUTERINE PREGNANCY IN MULTIGRAVIDA IN THIRD TRIMESTER: Primary | ICD-10-CM

## 2021-03-24 DIAGNOSIS — Z23 NEED FOR TDAP VACCINATION: ICD-10-CM

## 2021-03-24 PROBLEM — Z34.80 SUPERVISION OF NORMAL INTRAUTERINE PREGNANCY IN MULTIGRAVIDA: Status: ACTIVE | Noted: 2020-11-10

## 2021-03-24 LAB
ERYTHROCYTE [DISTWIDTH] IN BLOOD BY AUTOMATED COUNT: 13.2 % (ref 10–15)
GLUCOSE 1H P 50 G GLC PO SERPL-MCNC: 87 MG/DL (ref 60–129)
HCT VFR BLD AUTO: 33.5 % (ref 35–47)
HGB BLD-MCNC: 11 G/DL (ref 11.7–15.7)
MCH RBC QN AUTO: 30.4 PG (ref 26.5–33)
MCHC RBC AUTO-ENTMCNC: 32.8 G/DL (ref 31.5–36.5)
MCV RBC AUTO: 93 FL (ref 78–100)
PLATELET # BLD AUTO: 215 10E9/L (ref 150–450)
RBC # BLD AUTO: 3.62 10E12/L (ref 3.8–5.2)
WBC # BLD AUTO: 9.6 10E9/L (ref 4–11)

## 2021-03-24 PROCEDURE — 36415 COLL VENOUS BLD VENIPUNCTURE: CPT | Performed by: OBSTETRICS & GYNECOLOGY

## 2021-03-24 PROCEDURE — 82950 GLUCOSE TEST: CPT | Performed by: OBSTETRICS & GYNECOLOGY

## 2021-03-24 PROCEDURE — 85027 COMPLETE CBC AUTOMATED: CPT | Performed by: OBSTETRICS & GYNECOLOGY

## 2021-03-24 PROCEDURE — 99207 PR PRENATAL VISIT: CPT | Performed by: OBSTETRICS & GYNECOLOGY

## 2021-03-24 PROCEDURE — 90471 IMMUNIZATION ADMIN: CPT

## 2021-03-24 PROCEDURE — 90715 TDAP VACCINE 7 YRS/> IM: CPT

## 2021-03-24 NOTE — PROGRESS NOTES
Prior to immunization administration, verified patients identity using patient s name and date of birth. Please see Immunization Activity for additional information.     Screening Questionnaire for Adult Immunization    Are you sick today?   No   Do you have allergies to medications, food, a vaccine component or latex?   No   Have you ever had a serious reaction after receiving a vaccination?   No   Do you have a long-term health problem with heart, lung, kidney, or metabolic disease (e.g., diabetes), asthma, a blood disorder, no spleen, complement component deficiency, a cochlear implant, or a spinal fluid leak?  Are you on long-term aspirin therapy?   No   Do you have cancer, leukemia, HIV/AIDS, or any other immune system problem?   No   Do you have a parent, brother, or sister with an immune system problem?   No   In the past 3 months, have you taken medications that affect  your immune system, such as prednisone, other steroids, or anticancer drugs; drugs for the treatment of rheumatoid arthritis, Crohn s disease, or psoriasis; or have you had radiation treatments?   No   Have you had a seizure, or a brain or other nervous system problem?   No   During the past year, have you received a transfusion of blood or blood    products, or been given immune (gamma) globulin or antiviral drug?   No   For women: Are you pregnant or is there a chance you could become       pregnant during the next month?   yes   Have you received any vaccinations in the past 4 weeks?   No     Immunization questionnaire answers were all negative.        Per orders of Dr. Nixon, injection of Tdap given by Millie Mejia CMA. Patient instructed to remain in clinic for 15 minutes afterwards, and to report any adverse reaction to me immediately.       Screening performed by Millie Mejia CMA on 3/24/2021 at 9:41 AM.

## 2021-03-24 NOTE — PROGRESS NOTES
Patient is doing well  Had 1 hr gtt today.  It was normal.  Reviewed PTL precautions, kick counts  Tdap today

## 2021-03-24 NOTE — PROGRESS NOTES
Syphilis is a sexually transmitted disease that can cause birth defects in the babies of untreated mothers. Every pregnant patient is tested for syphilis early in each pregnancy as part of the routine lab work. The [place] [facility] of [facility] has seen an increase in the rate of syphilis in [place]. The [facility] now recommends testing for syphilis 3 times during a pregnancy, the new prenatal visit, 28 weeks and when admitted for delivery. Patient declines lab testing for syphilis.

## 2021-04-07 ENCOUNTER — PRENATAL OFFICE VISIT (OUTPATIENT)
Dept: OBGYN | Facility: CLINIC | Age: 31
End: 2021-04-07
Payer: COMMERCIAL

## 2021-04-07 VITALS — WEIGHT: 188.6 LBS | DIASTOLIC BLOOD PRESSURE: 60 MMHG | SYSTOLIC BLOOD PRESSURE: 102 MMHG | BODY MASS INDEX: 29.98 KG/M2

## 2021-04-07 DIAGNOSIS — Z3A.30 30 WEEKS GESTATION OF PREGNANCY: ICD-10-CM

## 2021-04-07 DIAGNOSIS — Z34.83 SUPERVISION OF NORMAL INTRAUTERINE PREGNANCY IN MULTIGRAVIDA IN THIRD TRIMESTER: Primary | ICD-10-CM

## 2021-04-07 PROCEDURE — 99207 PR PRENATAL VISIT: CPT | Performed by: OBSTETRICS & GYNECOLOGY

## 2021-04-07 NOTE — PROGRESS NOTES
Doing well.  No concerns today.  Prenatal flowsheet information is reviewed.  Discussed PTL, PROM, and when to call or come in.  Discussed kick counts and fetal movement.  Reportable signs and symptoms discussed.

## 2021-04-23 ENCOUNTER — PRENATAL OFFICE VISIT (OUTPATIENT)
Dept: OBGYN | Facility: CLINIC | Age: 31
End: 2021-04-23
Payer: COMMERCIAL

## 2021-04-23 VITALS — SYSTOLIC BLOOD PRESSURE: 120 MMHG | BODY MASS INDEX: 30.68 KG/M2 | WEIGHT: 193 LBS | DIASTOLIC BLOOD PRESSURE: 74 MMHG

## 2021-04-23 DIAGNOSIS — Z3A.32 32 WEEKS GESTATION OF PREGNANCY: Primary | ICD-10-CM

## 2021-04-23 DIAGNOSIS — Z34.83 SUPERVISION OF NORMAL INTRAUTERINE PREGNANCY IN MULTIGRAVIDA IN THIRD TRIMESTER: ICD-10-CM

## 2021-04-23 PROCEDURE — 99207 PR PRENATAL VISIT: CPT | Performed by: OBSTETRICS & GYNECOLOGY

## 2021-04-23 NOTE — PROGRESS NOTES
Doing well today.  +FM  No ctx/cramping/vb/lof  Overall feeling well  Had first COVID vaccine last week --moderna  RTC 2wks

## 2021-05-04 ENCOUNTER — PRENATAL OFFICE VISIT (OUTPATIENT)
Dept: OBGYN | Facility: CLINIC | Age: 31
End: 2021-05-04
Payer: COMMERCIAL

## 2021-05-04 VITALS — DIASTOLIC BLOOD PRESSURE: 66 MMHG | BODY MASS INDEX: 31 KG/M2 | SYSTOLIC BLOOD PRESSURE: 120 MMHG | WEIGHT: 195 LBS

## 2021-05-04 DIAGNOSIS — Z34.83 SUPERVISION OF NORMAL INTRAUTERINE PREGNANCY IN MULTIGRAVIDA IN THIRD TRIMESTER: ICD-10-CM

## 2021-05-04 DIAGNOSIS — Z3A.34 34 WEEKS GESTATION OF PREGNANCY: Primary | ICD-10-CM

## 2021-05-04 PROCEDURE — 99207 PR PRENATAL VISIT: CPT | Performed by: OBSTETRICS & GYNECOLOGY

## 2021-05-04 NOTE — PROGRESS NOTES
Doing well today.  No concerns/issues  No cramping/vb/lof  No bowel/bladder issues  Will get 2nd vaccine next week  RTC 2wks

## 2021-05-18 ENCOUNTER — PRENATAL OFFICE VISIT (OUTPATIENT)
Dept: OBGYN | Facility: CLINIC | Age: 31
End: 2021-05-18
Payer: COMMERCIAL

## 2021-05-18 VITALS — BODY MASS INDEX: 31.48 KG/M2 | DIASTOLIC BLOOD PRESSURE: 72 MMHG | WEIGHT: 198 LBS | SYSTOLIC BLOOD PRESSURE: 108 MMHG

## 2021-05-18 DIAGNOSIS — Z34.83 SUPERVISION OF NORMAL INTRAUTERINE PREGNANCY IN MULTIGRAVIDA IN THIRD TRIMESTER: ICD-10-CM

## 2021-05-18 DIAGNOSIS — Z3A.36 36 WEEKS GESTATION OF PREGNANCY: Primary | ICD-10-CM

## 2021-05-18 PROCEDURE — 87653 STREP B DNA AMP PROBE: CPT | Performed by: OBSTETRICS & GYNECOLOGY

## 2021-05-18 PROCEDURE — 99207 PR PRENATAL VISIT: CPT | Performed by: OBSTETRICS & GYNECOLOGY

## 2021-05-18 NOTE — PROGRESS NOTES
Doing well today.  +FM  No crmaping/vb/spotting; occ BH contractions   Labor precautions reviewed  GBS collected  Had 2nd COVID vaccine last week  RTC 1wk

## 2021-05-19 LAB
GP B STREP DNA SPEC QL NAA+PROBE: NEGATIVE
SPECIMEN SOURCE: NORMAL

## 2021-05-25 ENCOUNTER — PRENATAL OFFICE VISIT (OUTPATIENT)
Dept: OBGYN | Facility: CLINIC | Age: 31
End: 2021-05-25
Payer: COMMERCIAL

## 2021-05-25 VITALS — SYSTOLIC BLOOD PRESSURE: 120 MMHG | DIASTOLIC BLOOD PRESSURE: 74 MMHG | WEIGHT: 200 LBS | BODY MASS INDEX: 31.8 KG/M2

## 2021-05-25 DIAGNOSIS — Z34.83 SUPERVISION OF NORMAL INTRAUTERINE PREGNANCY IN MULTIGRAVIDA IN THIRD TRIMESTER: ICD-10-CM

## 2021-05-25 DIAGNOSIS — Z3A.37 37 WEEKS GESTATION OF PREGNANCY: Primary | ICD-10-CM

## 2021-05-25 PROCEDURE — 99207 PR PRENATAL VISIT: CPT | Performed by: OBSTETRICS & GYNECOLOGY

## 2021-05-25 NOTE — PROGRESS NOTES
Doing well today.  +FM  No ctx/cramping/vb/lof; +mucus discharge over the weekend  Labor precautions reviewed  GBS negative  RTC 1wk

## 2021-06-01 ENCOUNTER — PRENATAL OFFICE VISIT (OUTPATIENT)
Dept: OBGYN | Facility: CLINIC | Age: 31
End: 2021-06-01
Payer: COMMERCIAL

## 2021-06-01 VITALS — WEIGHT: 202 LBS | SYSTOLIC BLOOD PRESSURE: 120 MMHG | DIASTOLIC BLOOD PRESSURE: 84 MMHG | BODY MASS INDEX: 32.12 KG/M2

## 2021-06-01 DIAGNOSIS — Z34.83 SUPERVISION OF NORMAL INTRAUTERINE PREGNANCY IN MULTIGRAVIDA IN THIRD TRIMESTER: ICD-10-CM

## 2021-06-01 DIAGNOSIS — Z3A.38 38 WEEKS GESTATION OF PREGNANCY: Primary | ICD-10-CM

## 2021-06-01 PROCEDURE — 99207 PR PRENATAL VISIT: CPT | Performed by: OBSTETRICS & GYNECOLOGY

## 2021-06-01 NOTE — PROGRESS NOTES
Doing well today. +FM  No ctx/cramping/vb/spotting/lof  No more mucus discharge  No bowel/bladder issues  Labor precautions reviewed  RTC 1wk --seeing Dr. Gusman in my absence

## 2021-06-05 ENCOUNTER — HOSPITAL ENCOUNTER (INPATIENT)
Facility: CLINIC | Age: 31
LOS: 2 days | Discharge: HOME HEALTH | End: 2021-06-07
Attending: OBSTETRICS & GYNECOLOGY | Admitting: OBSTETRICS & GYNECOLOGY
Payer: COMMERCIAL

## 2021-06-05 ENCOUNTER — ANESTHESIA EVENT (OUTPATIENT)
Dept: OBGYN | Facility: CLINIC | Age: 31
End: 2021-06-05
Payer: COMMERCIAL

## 2021-06-05 ENCOUNTER — ANESTHESIA (OUTPATIENT)
Dept: OBGYN | Facility: CLINIC | Age: 31
End: 2021-06-05
Payer: COMMERCIAL

## 2021-06-05 ENCOUNTER — NURSE TRIAGE (OUTPATIENT)
Dept: NURSING | Facility: CLINIC | Age: 31
End: 2021-06-05

## 2021-06-05 LAB
ABO + RH BLD: NORMAL
ABO + RH BLD: NORMAL
BASOPHILS # BLD AUTO: 0 10E9/L (ref 0–0.2)
BASOPHILS NFR BLD AUTO: 0.2 %
BLD GP AB SCN SERPL QL: NORMAL
BLOOD BANK CMNT PATIENT-IMP: NORMAL
DIFFERENTIAL METHOD BLD: ABNORMAL
EOSINOPHIL # BLD AUTO: 0 10E9/L (ref 0–0.7)
EOSINOPHIL NFR BLD AUTO: 0.3 %
ERYTHROCYTE [DISTWIDTH] IN BLOOD BY AUTOMATED COUNT: 13.7 % (ref 10–15)
HCT VFR BLD AUTO: 34.5 % (ref 35–47)
HGB BLD-MCNC: 11.7 G/DL (ref 11.7–15.7)
IMM GRANULOCYTES # BLD: 0.1 10E9/L (ref 0–0.4)
IMM GRANULOCYTES NFR BLD: 0.8 %
LABORATORY COMMENT REPORT: NORMAL
LYMPHOCYTES # BLD AUTO: 2 10E9/L (ref 0.8–5.3)
LYMPHOCYTES NFR BLD AUTO: 16.5 %
MCH RBC QN AUTO: 29.9 PG (ref 26.5–33)
MCHC RBC AUTO-ENTMCNC: 33.9 G/DL (ref 31.5–36.5)
MCV RBC AUTO: 88 FL (ref 78–100)
MONOCYTES # BLD AUTO: 0.8 10E9/L (ref 0–1.3)
MONOCYTES NFR BLD AUTO: 6.6 %
NEUTROPHILS # BLD AUTO: 9.1 10E9/L (ref 1.6–8.3)
NEUTROPHILS NFR BLD AUTO: 75.6 %
NRBC # BLD AUTO: 0 10*3/UL
NRBC BLD AUTO-RTO: 0 /100
PLATELET # BLD AUTO: 239 10E9/L (ref 150–450)
RBC # BLD AUTO: 3.91 10E12/L (ref 3.8–5.2)
SARS-COV-2 RNA RESP QL NAA+PROBE: NEGATIVE
SPECIMEN EXP DATE BLD: NORMAL
SPECIMEN SOURCE: NORMAL
T PALLIDUM AB SER QL: NONREACTIVE
WBC # BLD AUTO: 12.1 10E9/L (ref 4–11)

## 2021-06-05 PROCEDURE — 86780 TREPONEMA PALLIDUM: CPT | Performed by: OBSTETRICS & GYNECOLOGY

## 2021-06-05 PROCEDURE — 86901 BLOOD TYPING SEROLOGIC RH(D): CPT | Performed by: OBSTETRICS & GYNECOLOGY

## 2021-06-05 PROCEDURE — 722N000001 HC LABOR CARE VAGINAL DELIVERY SINGLE

## 2021-06-05 PROCEDURE — 370N000003 HC ANESTHESIA WARD SERVICE

## 2021-06-05 PROCEDURE — 86900 BLOOD TYPING SEROLOGIC ABO: CPT | Performed by: OBSTETRICS & GYNECOLOGY

## 2021-06-05 PROCEDURE — 258N000003 HC RX IP 258 OP 636: Performed by: ANESTHESIOLOGY

## 2021-06-05 PROCEDURE — 00HU33Z INSERTION OF INFUSION DEVICE INTO SPINAL CANAL, PERCUTANEOUS APPROACH: ICD-10-PCS | Performed by: ANESTHESIOLOGY

## 2021-06-05 PROCEDURE — 36415 COLL VENOUS BLD VENIPUNCTURE: CPT | Performed by: OBSTETRICS & GYNECOLOGY

## 2021-06-05 PROCEDURE — 59025 FETAL NON-STRESS TEST: CPT

## 2021-06-05 PROCEDURE — 250N000011 HC RX IP 250 OP 636: Performed by: ANESTHESIOLOGY

## 2021-06-05 PROCEDURE — 3E0R3BZ INTRODUCTION OF ANESTHETIC AGENT INTO SPINAL CANAL, PERCUTANEOUS APPROACH: ICD-10-PCS | Performed by: ANESTHESIOLOGY

## 2021-06-05 PROCEDURE — 250N000013 HC RX MED GY IP 250 OP 250 PS 637: Performed by: OBSTETRICS & GYNECOLOGY

## 2021-06-05 PROCEDURE — G0463 HOSPITAL OUTPT CLINIC VISIT: HCPCS | Mod: 25

## 2021-06-05 PROCEDURE — 85025 COMPLETE CBC W/AUTO DIFF WBC: CPT | Performed by: OBSTETRICS & GYNECOLOGY

## 2021-06-05 PROCEDURE — 250N000009 HC RX 250: Performed by: OBSTETRICS & GYNECOLOGY

## 2021-06-05 PROCEDURE — 0UQMXZZ REPAIR VULVA, EXTERNAL APPROACH: ICD-10-PCS | Performed by: OBSTETRICS & GYNECOLOGY

## 2021-06-05 PROCEDURE — 0KQM0ZZ REPAIR PERINEUM MUSCLE, OPEN APPROACH: ICD-10-PCS | Performed by: OBSTETRICS & GYNECOLOGY

## 2021-06-05 PROCEDURE — 87635 SARS-COV-2 COVID-19 AMP PRB: CPT | Performed by: OBSTETRICS & GYNECOLOGY

## 2021-06-05 PROCEDURE — 120N000012 HC R&B POSTPARTUM

## 2021-06-05 PROCEDURE — 258N000003 HC RX IP 258 OP 636: Performed by: OBSTETRICS & GYNECOLOGY

## 2021-06-05 PROCEDURE — 86850 RBC ANTIBODY SCREEN: CPT | Performed by: OBSTETRICS & GYNECOLOGY

## 2021-06-05 PROCEDURE — 59400 OBSTETRICAL CARE: CPT | Performed by: OBSTETRICS & GYNECOLOGY

## 2021-06-05 RX ORDER — SODIUM CHLORIDE, SODIUM LACTATE, POTASSIUM CHLORIDE, CALCIUM CHLORIDE 600; 310; 30; 20 MG/100ML; MG/100ML; MG/100ML; MG/100ML
INJECTION, SOLUTION INTRAVENOUS CONTINUOUS
Status: DISCONTINUED | OUTPATIENT
Start: 2021-06-05 | End: 2021-06-05

## 2021-06-05 RX ORDER — TRANEXAMIC ACID 10 MG/ML
1 INJECTION, SOLUTION INTRAVENOUS EVERY 30 MIN PRN
Status: DISCONTINUED | OUTPATIENT
Start: 2021-06-05 | End: 2021-06-05

## 2021-06-05 RX ORDER — ONDANSETRON 4 MG/1
4 TABLET, ORALLY DISINTEGRATING ORAL EVERY 6 HOURS PRN
Status: DISCONTINUED | OUTPATIENT
Start: 2021-06-05 | End: 2021-06-05

## 2021-06-05 RX ORDER — NALOXONE HYDROCHLORIDE 0.4 MG/ML
0.4 INJECTION, SOLUTION INTRAMUSCULAR; INTRAVENOUS; SUBCUTANEOUS
Status: DISCONTINUED | OUTPATIENT
Start: 2021-06-05 | End: 2021-06-05

## 2021-06-05 RX ORDER — OXYTOCIN/0.9 % SODIUM CHLORIDE 30/500 ML
340 PLASTIC BAG, INJECTION (ML) INTRAVENOUS CONTINUOUS PRN
Status: DISCONTINUED | OUTPATIENT
Start: 2021-06-05 | End: 2021-06-07 | Stop reason: HOSPADM

## 2021-06-05 RX ORDER — MODIFIED LANOLIN
OINTMENT (GRAM) TOPICAL
Status: DISCONTINUED | OUTPATIENT
Start: 2021-06-05 | End: 2021-06-07 | Stop reason: HOSPADM

## 2021-06-05 RX ORDER — ROPIVACAINE HYDROCHLORIDE 2 MG/ML
10 INJECTION, SOLUTION EPIDURAL; INFILTRATION; PERINEURAL ONCE
Status: DISCONTINUED | OUTPATIENT
Start: 2021-06-05 | End: 2021-06-05

## 2021-06-05 RX ORDER — AMOXICILLIN 250 MG
2 CAPSULE ORAL 2 TIMES DAILY
Status: DISCONTINUED | OUTPATIENT
Start: 2021-06-05 | End: 2021-06-07 | Stop reason: HOSPADM

## 2021-06-05 RX ORDER — OXYTOCIN/0.9 % SODIUM CHLORIDE 30/500 ML
1-24 PLASTIC BAG, INJECTION (ML) INTRAVENOUS CONTINUOUS
Status: DISCONTINUED | OUTPATIENT
Start: 2021-06-05 | End: 2021-06-05

## 2021-06-05 RX ORDER — BISACODYL 10 MG
10 SUPPOSITORY, RECTAL RECTAL DAILY PRN
Status: DISCONTINUED | OUTPATIENT
Start: 2021-06-07 | End: 2021-06-07 | Stop reason: HOSPADM

## 2021-06-05 RX ORDER — LIDOCAINE HYDROCHLORIDE AND EPINEPHRINE 15; 5 MG/ML; UG/ML
3 INJECTION, SOLUTION EPIDURAL
Status: DISCONTINUED | OUTPATIENT
Start: 2021-06-05 | End: 2021-06-05

## 2021-06-05 RX ORDER — EPHEDRINE SULFATE 50 MG/ML
5 INJECTION, SOLUTION INTRAMUSCULAR; INTRAVENOUS; SUBCUTANEOUS
Status: DISCONTINUED | OUTPATIENT
Start: 2021-06-05 | End: 2021-06-05

## 2021-06-05 RX ORDER — IBUPROFEN 400 MG/1
800 TABLET, FILM COATED ORAL EVERY 6 HOURS PRN
Status: DISCONTINUED | OUTPATIENT
Start: 2021-06-05 | End: 2021-06-07 | Stop reason: HOSPADM

## 2021-06-05 RX ORDER — AMOXICILLIN 250 MG
1 CAPSULE ORAL 2 TIMES DAILY
Status: DISCONTINUED | OUTPATIENT
Start: 2021-06-05 | End: 2021-06-07 | Stop reason: HOSPADM

## 2021-06-05 RX ORDER — NALOXONE HYDROCHLORIDE 0.4 MG/ML
0.2 INJECTION, SOLUTION INTRAMUSCULAR; INTRAVENOUS; SUBCUTANEOUS
Status: DISCONTINUED | OUTPATIENT
Start: 2021-06-05 | End: 2021-06-05

## 2021-06-05 RX ORDER — ONDANSETRON 2 MG/ML
4 INJECTION INTRAMUSCULAR; INTRAVENOUS EVERY 6 HOURS PRN
Status: DISCONTINUED | OUTPATIENT
Start: 2021-06-05 | End: 2021-06-05

## 2021-06-05 RX ORDER — OXYTOCIN 10 [USP'U]/ML
10 INJECTION, SOLUTION INTRAMUSCULAR; INTRAVENOUS
Status: DISCONTINUED | OUTPATIENT
Start: 2021-06-05 | End: 2021-06-05

## 2021-06-05 RX ORDER — ACETAMINOPHEN 325 MG/1
650 TABLET ORAL EVERY 4 HOURS PRN
Status: DISCONTINUED | OUTPATIENT
Start: 2021-06-05 | End: 2021-06-07 | Stop reason: HOSPADM

## 2021-06-05 RX ORDER — HYDROCORTISONE 2.5 %
CREAM (GRAM) TOPICAL 3 TIMES DAILY PRN
Status: DISCONTINUED | OUTPATIENT
Start: 2021-06-05 | End: 2021-06-07 | Stop reason: HOSPADM

## 2021-06-05 RX ORDER — METHYLERGONOVINE MALEATE 0.2 MG/ML
200 INJECTION INTRAVENOUS
Status: DISCONTINUED | OUTPATIENT
Start: 2021-06-05 | End: 2021-06-05

## 2021-06-05 RX ORDER — NALBUPHINE HYDROCHLORIDE 10 MG/ML
2.5-5 INJECTION, SOLUTION INTRAMUSCULAR; INTRAVENOUS; SUBCUTANEOUS EVERY 6 HOURS PRN
Status: DISCONTINUED | OUTPATIENT
Start: 2021-06-05 | End: 2021-06-05

## 2021-06-05 RX ORDER — OXYTOCIN 10 [USP'U]/ML
10 INJECTION, SOLUTION INTRAMUSCULAR; INTRAVENOUS
Status: DISCONTINUED | OUTPATIENT
Start: 2021-06-05 | End: 2021-06-07 | Stop reason: HOSPADM

## 2021-06-05 RX ORDER — OXYTOCIN/0.9 % SODIUM CHLORIDE 30/500 ML
100-340 PLASTIC BAG, INJECTION (ML) INTRAVENOUS CONTINUOUS PRN
Status: DISCONTINUED | OUTPATIENT
Start: 2021-06-05 | End: 2021-06-05

## 2021-06-05 RX ORDER — CARBOPROST TROMETHAMINE 250 UG/ML
250 INJECTION, SOLUTION INTRAMUSCULAR
Status: DISCONTINUED | OUTPATIENT
Start: 2021-06-05 | End: 2021-06-05

## 2021-06-05 RX ORDER — FENTANYL CITRATE 50 UG/ML
100 INJECTION, SOLUTION INTRAMUSCULAR; INTRAVENOUS ONCE
Status: DISCONTINUED | OUTPATIENT
Start: 2021-06-05 | End: 2021-06-05

## 2021-06-05 RX ORDER — ACETAMINOPHEN 325 MG/1
650 TABLET ORAL EVERY 4 HOURS PRN
Status: DISCONTINUED | OUTPATIENT
Start: 2021-06-05 | End: 2021-06-05

## 2021-06-05 RX ORDER — IBUPROFEN 400 MG/1
800 TABLET, FILM COATED ORAL
Status: DISCONTINUED | OUTPATIENT
Start: 2021-06-05 | End: 2021-06-05

## 2021-06-05 RX ORDER — OXYCODONE AND ACETAMINOPHEN 5; 325 MG/1; MG/1
1 TABLET ORAL
Status: DISCONTINUED | OUTPATIENT
Start: 2021-06-05 | End: 2021-06-05

## 2021-06-05 RX ORDER — ROPIVACAINE HYDROCHLORIDE 2 MG/ML
INJECTION, SOLUTION EPIDURAL; INFILTRATION; PERINEURAL
Status: COMPLETED | OUTPATIENT
Start: 2021-06-05 | End: 2021-06-05

## 2021-06-05 RX ORDER — LIDOCAINE 40 MG/G
CREAM TOPICAL
Status: DISCONTINUED | OUTPATIENT
Start: 2021-06-05 | End: 2021-06-05

## 2021-06-05 RX ORDER — TRANEXAMIC ACID 10 MG/ML
1 INJECTION, SOLUTION INTRAVENOUS EVERY 30 MIN PRN
Status: DISCONTINUED | OUTPATIENT
Start: 2021-06-05 | End: 2021-06-07 | Stop reason: HOSPADM

## 2021-06-05 RX ORDER — FENTANYL CITRATE 50 UG/ML
INJECTION, SOLUTION INTRAMUSCULAR; INTRAVENOUS
Status: COMPLETED | OUTPATIENT
Start: 2021-06-05 | End: 2021-06-05

## 2021-06-05 RX ORDER — OXYTOCIN/0.9 % SODIUM CHLORIDE 30/500 ML
100 PLASTIC BAG, INJECTION (ML) INTRAVENOUS CONTINUOUS
Status: DISCONTINUED | OUTPATIENT
Start: 2021-06-05 | End: 2021-06-07 | Stop reason: HOSPADM

## 2021-06-05 RX ADMIN — Medication 2 MILLI-UNITS/MIN: at 13:15

## 2021-06-05 RX ADMIN — IBUPROFEN 800 MG: 400 TABLET ORAL at 18:08

## 2021-06-05 RX ADMIN — Medication 340 ML/HR: at 16:01

## 2021-06-05 RX ADMIN — SODIUM CHLORIDE, POTASSIUM CHLORIDE, SODIUM LACTATE AND CALCIUM CHLORIDE 1000 ML: 600; 310; 30; 20 INJECTION, SOLUTION INTRAVENOUS at 09:40

## 2021-06-05 RX ADMIN — ROPIVACAINE HYDROCHLORIDE 10 ML: 2 INJECTION, SOLUTION EPIDURAL; INFILTRATION at 10:15

## 2021-06-05 RX ADMIN — Medication 100 ML/HR: at 16:32

## 2021-06-05 RX ADMIN — ACETAMINOPHEN 650 MG: 325 TABLET, FILM COATED ORAL at 18:08

## 2021-06-05 RX ADMIN — FENTANYL CITRATE 100 MCG: 50 INJECTION, SOLUTION INTRAMUSCULAR; INTRAVENOUS at 10:15

## 2021-06-05 RX ADMIN — Medication 12 ML/HR: at 10:10

## 2021-06-05 RX ADMIN — SENNOSIDES AND DOCUSATE SODIUM 1 TABLET: 8.6; 5 TABLET ORAL at 19:49

## 2021-06-05 RX ADMIN — SODIUM CHLORIDE, POTASSIUM CHLORIDE, SODIUM LACTATE AND CALCIUM CHLORIDE: 600; 310; 30; 20 INJECTION, SOLUTION INTRAVENOUS at 12:00

## 2021-06-05 NOTE — ANESTHESIA PROCEDURE NOTES
Epidural catheter Procedure Note  Pre-Procedure   Staff -        Anesthesiologist:  Kofi Cazares DO       Performed By: anesthesiologist       Location: OB       Pre-Anesthestic Checklist: patient identified, IV checked, site marked, risks and benefits discussed, informed consent, monitors and equipment checked, pre-op evaluation and at physician/surgeon's request  Timeout:       Correct Patient: Yes        Correct Procedure: Yes        Correct Site: Yes        Correct Position: Yes   Procedure Documentation  Procedure: epidural catheter       Patient Position: sitting       Skin prep: Betadine       Local skin infiltrated with 1 mL of 1% lidocaine.        Insertion Site: L2-3. (midline approach).       Technique: LORT saline and LORT air        Needle Type: Touhy needle       Needle Gauge: 17.        Needle Length (Inches): 3.5        Catheter: 19 G.         Catheter threaded easily.        # of attempts: 1 and  # of redirects:     Assessment/Narrative         Paresthesias: No.      Test dose of 3 mL lidocaine 1.5% w/ 1:200,000 epinephrine at.         Test dose negative, 3 minutes after injection, for signs of intravascular, subdural, or intrathecal injection.       Insertion/Infusion Method: LORT saline and LORT air       Aspiration negative for Heme or CSF via Epidural Catheter.    Medication(s) Administered   0.2% Ropivacaine (Epidural), 10 mL  Fentanyl PF (Epidural), 100 mcg  Medication Administration Time: 6/5/2021 10:15 AM    Comments:  Pre-procedure time out completed with L&D nursing staff. Patient in seated position on side of bed, the lumbar spine was prepped and draped in sterile fashion. The L2/L3 interspace was identified and local anesthetic was injected for local skin infiltration. A 17 G touhy needle was advanced to the epidural space which was confirmed with the loss of resistance technique at 4.5 cm. A catheter was then advanced easily into the epidural space. The catheter was left at 10  cm at the skin. No aspiration of blood or CSF. 3 mL test dose of 1.5% lidocaine with 1:200,000 epinephrine was injected through the catheter and was negative for intravascular injection. The site was covered with sterile tegaderm and the catheter was secured with tape.

## 2021-06-05 NOTE — H&P
No significant change in general health status based on examination of the patient, review of Nursing Admission Database and prenatal record.  29yo  admitted at 38+4wks with SROM and early labor.  Uncomplicated pregnancy.  Expectant management.  Epidural if/when desired.    EFW: 7lb-7.5lbs    Sindhu Middleton MD

## 2021-06-05 NOTE — L&D DELIVERY NOTE
Delivery Date: 2021    HISTORY:  Poonam is a 30-year-old G1, P0 who presented to labor and delivery at 38 + 4 weeks gestation with spontaneous rupture of membranes.  Poonam's pregnancy had been overall uncomplicated and followed by myself.  Prenatal laboratories were within normal limits including blood type 0-positive, rubella status immune, glucola within normal limits, and GBS negative.  Poonam received pertussis, flu, and COVID vaccinations during this pregnancy.  Poonam had normal genetic screening including a  screen as well as alpha fetoprotein testing.  Estimated fetal weight is 7 to 7.5 pounds.     DELIVERY COURSE:  On arrival to labor and delivery Poonam was confirmed to be spontaneously ruptured and was veronica every 3-6 minutes.  Cervix was 4 cm dilated, 70% effaced, and -1 station.  She was, therefore, admitted and expectantly managed.  Poonam received an epidural for pain control after which she was still found to be 4 cm dilated.  Fetal heart tones remained reactive throughout the labor process with baseline in the 130s-140s with moderate variability and accelerations present.  Given minimal cervical change and spaced out contractions, Pitocin was started at approximately 1:00 p.m.  Poonam made excellent progress throughout the afternoon and became complete and 0 station at approximately 3:30.   Variable decelerations and Poonam was feeling some pressure, was therefore checked and was found to be +2 station and began pushing shortly thereafter.  With excellent maternal effort, Poonam delivered a female infant in the left occiput anterior position.  No nuchal cords were noted.  Remainder of infant was delivered and placed on maternal abdomen.  Cord clamping was delayed by 1 minute.  Placenta was delivered spontaneously and intact.  Second degree perineal laceration with extension into the left labia majora was repaired with excellent reapproximation and hemostasis.  Fundus was firm and  bleeding was minimal.  Both mother and infant were doing very well following delivery and are stable for recovery.      Sindhu Middleton MD        D: 2021   T: 2021   MT: [facility]    Name:     TYSHAWN PARKINSON  MRN:      [id number]        Account:       [id number]   :      1990           Delivery Date: 2021     Document: [id number]    cc:  Sindhu Middleton MD

## 2021-06-05 NOTE — PLAN OF CARE
0952- Dr. Cazares, MEMO @bedside to place epidural. Epidural meds (ropivicaine, fentanyl) pulled from xis by CANDACE Martini RN and handed off to MEMO to administer, document, and dispose of/waste. Anesthesia L&D Procedure Narcotic Time Out Form completed by MEMO and RN.

## 2021-06-05 NOTE — ANESTHESIA PREPROCEDURE EVALUATION
Anesthesia Pre-Procedure Evaluation    Patient: Poonam Urban   MRN: [id number] : 1990        Preoperative Diagnosis: * No surgery found *   Procedure :      Past Medical History:   Diagnosis Date     Menarche 13 years old      Past Surgical History:   Procedure Laterality Date     EYE SURGERY       LASIK BILATERAL        Allergies   Allergen Reactions     Dairy Products [Milk Protein Extract]       Social History     Tobacco Use     Smoking status: Never Smoker     Smokeless tobacco: Never Used   Substance Use Topics     Alcohol use: Yes     Alcohol/week: 2.5 - 3.3 standard drinks     Types: 3 - 4 Standard drinks or equivalent per week     Frequency: 2-3 times a week     Drinks per session: 1 or 2     Binge frequency: Never      Wt Readings from Last 1 Encounters:   21 91.6 kg (202 lb)              OUTSIDE LABS:  CBC:   Lab Results   Component Value Date    WBC 12.1 (H) 2021    WBC 9.6 2021    HGB 11.7 2021    HGB 11.0 (L) 2021    HCT 34.5 (L) 2021    HCT 33.5 (L) 2021     2021     2021     BMP:   Lab Results   Component Value Date    GLC 75 2019    GLC 78 2018     COAGS: No results found for: PTT, INR, FIBR  POC: No results found for: BGM, HCG, HCGS  HEPATIC: No results found for: ALBUMIN, PROTTOTAL, ALT, AST, GGT, ALKPHOS, BILITOTAL, BILIDIRECT, ELICIA  OTHER:   Lab Results   Component Value Date    A1C 5.2 2020    TSH 1.38 2020       Anesthesia Plan    ASA Status:  2      Anesthesia Type: Epidural.              Consents    Anesthesia Plan(s) and associated risks, benefits, and realistic alternatives discussed. Questions answered and patient/representative(s) expressed understanding.     - Discussed with:  Patient         Postoperative Care            Comments:                Kofi Cazares DO

## 2021-06-05 NOTE — PROGRESS NOTES
0720: Pt arrives to MAC for membrane and labor assessment.  0728: Consent given to place external monitors and to obtain vital signs and medical history. Pt states she woke up at 0530 and noticed leaking of fluid. Pt states she is having some ctx as well.   0735: Consent given for SVE. Large amount of clear fluid noted on chucks and perineum. SVE 4/75/-1.  0745: Dr. Middleton in department. Verbal orders to admit pt for labor. Reactive NST, ctx q3-6 min.  0753: Monitors removed. Pt agrees with plan of care. Pt ambulated to room 220. Report to Jennifer BELL RN, who will assume cares.

## 2021-06-05 NOTE — PLAN OF CARE
Data: Poonam Urban transferred to [facility] rm 429 via wheelchair at 1830. Baby transferred via crib.  Action: Receiving unit notified of transfer: Yes. Patient and family notified of room change. Report given to ANA Verduzco RN at 1830. Belongings sent to receiving unit. Accompanied by Registered Nurse. Oriented patient to surroundings. Call light within reach. ID bands double-checked with receiving RN.  Response: Patient tolerated transfer and is stable.

## 2021-06-05 NOTE — TELEPHONE ENCOUNTER
Patient is pregnant and NADIA is 06/15/21. Patient says that her water broke about an hour ago, and she has had two more leakage of fluid. Patient is having cramping episodes that are five minutes apart.  Triage guidelines recommend to go to labor and delivery.   Triager called out to [facility] labor and delivery to update staff on patient pending arrival.  Caller verbalized and understands directives.  COVID 19 Nurse Triage Plan/Patient Instructions    Please be aware that novel coronavirus (COVID-19) may be circulating in the community. If you develop symptoms such as fever, cough, or SOB or if you have concerns about the presence of another infection including coronavirus (COVID-19), please contact your health care provider or visit [email or web address].     Disposition/Instructions    In-Person Visit with provider recommended. Reference Visit Selection Guide.    Thank you for taking steps to prevent the spread of this virus.  o Limit your contact with others.  o Wear a simple mask to cover your cough.  o Wash your hands well and often.    Resources    [facility]: About COVID-19: [email or web address]    CDC: What to Do If You're Sick: www.cdc.gov/coronavirus/2019-ncov/about/steps-when-sick.html    CDC: Ending Home Isolation: www.cdc.gov/coronavirus/2019-ncov/hcp/disposition-in-home-patients.html     CDC: Caring for Someone: www.cdc.gov/coronavirus/2019-ncov/if-you-are-sick/care-for-someone.html     [facility]: Interim Guidance for Hospital Discharge to Home: www.health.[facility].[place].us/diseases/coronavirus/hcp/hospdischarge.pdf    [facility] clinical trials (COVID-19 research studies): [email or web address]/umn-clinical-trials     Below are the COVID-19 hotlines at the [place] Department of Health ([facility]). Interpreters are available.   o For health questions: Call 276-609-7400 or 1-877.849.1174 (7 a.m. to 7 p.m.)  o For questions about schools and childcare: Call 585-170-3195 or  "1-412.666.7634 (7 a.m. to 7 p.m.)                     Reason for Disposition    [1] Leakage of fluid from vagina AND [2] leakage started > 4 hours ago    Additional Information    Negative: Passed out (i.e., lost consciousness, collapsed and was not responding)    Negative: Shock suspected (e.g., cold/pale/clammy skin, too weak to stand, low BP, rapid pulse)    Negative: Difficult to awaken or acting confused (e.g., disoriented, slurred speech)    Negative: [1] SEVERE abdominal pain (e.g., excruciating) AND [2] constant AND [3] present > 1 hour    Negative: Severe bleeding (e.g., continuous red blood from vagina, or large blood clots)    Negative: Umbilical cord hanging out of the vagina (shiny, white, curled appearance, \"like telephone cord\")    Negative: Uncontrollable urge to push (i.e., feels like baby is coming out now)    Negative: Can see baby    Negative: Sounds like a life-threatening emergency to the triager    Negative: Pregnant < 37 weeks (i.e., )    Negative: [1] Uncertain delivery date AND [2] possibly pregnant < 37 weeks (i.e., )    Negative: [1] First baby (primipara) AND [2] contractions < 6 minutes apart  AND [3] present 2 hours    Negative: [1] History of prior delivery (multipara) AND [2] contractions < 10 minutes apart AND [3] present 1 hour    Negative: [1] History of rapid prior delivery AND [2] contractions < 10 minutes apart    Negative: [1] Leakage of fluid from vagina AND [2] green or brown in color    Protocols used: PREGNANCY - LABOR-A-AH      "

## 2021-06-05 NOTE — L&D DELIVERY NOTE
of viable female at 1557   Baby's weight 7lbs 2oz     Apgars 9, 9     Placenta delivered spontaneously and intact   2nd degree laceration repaired   QBL 400mL    Primary OB: Middleton

## 2021-06-06 LAB — HGB BLD-MCNC: 10.1 G/DL (ref 11.7–15.7)

## 2021-06-06 PROCEDURE — 250N000013 HC RX MED GY IP 250 OP 250 PS 637: Performed by: OBSTETRICS & GYNECOLOGY

## 2021-06-06 PROCEDURE — 120N000012 HC R&B POSTPARTUM

## 2021-06-06 PROCEDURE — 85018 HEMOGLOBIN: CPT | Performed by: OBSTETRICS & GYNECOLOGY

## 2021-06-06 PROCEDURE — 36415 COLL VENOUS BLD VENIPUNCTURE: CPT | Performed by: OBSTETRICS & GYNECOLOGY

## 2021-06-06 RX ADMIN — SENNOSIDES AND DOCUSATE SODIUM 1 TABLET: 8.6; 5 TABLET ORAL at 20:00

## 2021-06-06 RX ADMIN — IBUPROFEN 800 MG: 400 TABLET ORAL at 05:47

## 2021-06-06 RX ADMIN — ACETAMINOPHEN 650 MG: 325 TABLET, FILM COATED ORAL at 00:25

## 2021-06-06 RX ADMIN — IBUPROFEN 800 MG: 400 TABLET ORAL at 11:34

## 2021-06-06 RX ADMIN — IBUPROFEN 800 MG: 400 TABLET ORAL at 00:25

## 2021-06-06 RX ADMIN — ACETAMINOPHEN 650 MG: 325 TABLET, FILM COATED ORAL at 05:46

## 2021-06-06 RX ADMIN — ACETAMINOPHEN 650 MG: 325 TABLET, FILM COATED ORAL at 17:54

## 2021-06-06 RX ADMIN — IBUPROFEN 800 MG: 400 TABLET ORAL at 17:54

## 2021-06-06 RX ADMIN — SENNOSIDES AND DOCUSATE SODIUM 1 TABLET: 8.6; 5 TABLET ORAL at 08:45

## 2021-06-06 RX ADMIN — ACETAMINOPHEN 650 MG: 325 TABLET, FILM COATED ORAL at 11:33

## 2021-06-06 ASSESSMENT — ACTIVITIES OF DAILY LIVING (ADL)
TOILETING_ISSUES: NO
FALL_HISTORY_WITHIN_LAST_SIX_MONTHS: NO

## 2021-06-06 NOTE — PLAN OF CARE
Vital signs stable. Postpartum assessment WDL. Uterine fundus is firm and midline. Using ice and tucks. Pain well controlled with Tylenol and Ibuprofen. Up and ambulating; free of dizziness. Voiding. Pt's nipples are very sore when  is latched and feeding at the breast. Educated parents on proper latch techniques. After a few attempts, pt could not deal with pain and a nipple shield was given. Pt states the nipple shield has been helping and is using her own nipple cream. Breastfeeding fair. Mom started pumping in AM. Plan to feed EBM at breast with tube/syringe. Questions/concerns addressed.

## 2021-06-06 NOTE — LACTATION NOTE
Check-in visit to assist with feeding. Baby sleepy, LC attempted to wake baby. She was sleepy even with stimulation but would suck eagerly on gloved finger. Encouraged father of baby, Chino, to give colostrum drops. Planned for LC to assist with pumping, but as baby was finger feeding, Chino noted eyes open and alert. Encouraged to bring back to breast and attempt to breastfeed.     LC assisted with set-up for football hold, added extra pillow support. Poonam was able to latch her to left breast with minimal assist, adding sandwich hold to breast from . Latch appeared wide after rolling lower lip down and out, but Poonam felt latch was pinchy. Removed baby from breast and noted nipple was compressed throughout. Encouraged utilizing nipple shield. Nipple shield placed, and LC assisted to bring baby to breast when mouth was wide, able to obtain a deep latch, rolled lower lip down and out, and baby started a nutritive suck pattern. Several audible swallows heard. Continued to feed through remainder of visit with good effort and audible swallows. Chino at bedside so reviewed ways he can help keep baby awake for a good feeding. Poonam and Chino very happy with how this feeding is going, and Poonam shared this is the most comfortable latching has felt. Encouraged to continue to use the nipple shield. Also encouraged to give small amount of colostrum left from last pump after this feeding.    Feeding plan: Continue to work on breastfeeding at least every 2-3 hours, unlimited time spent at breast. If baby Russel is sleepy, Poonam to pump and give EBM via finger feeding. If baby feeds well, no need to pump. Track feedings, voids and stools on feeding log. Poonam & Chino very appreciative of visit and Lactation support.    Anat Alexander, RN-C, IBCLC, MNN, PHN, BSN

## 2021-06-06 NOTE — PLAN OF CARE
Vital signs stable, assessment WNL. Pain controlled with Tylenol and ibuprofen; states satisfaction with pain management. Up in room independently with no complaints of dizziness, voiding without difficulty.  at bedside and supportive. Working on breastfeeding, using a nipple shield, pumping after feedings when baby is sleepy. Nipples sore, using lanolin after feedings and sore nipple shells given. Encouraged to call RN with needs, will continue to monitor.

## 2021-06-06 NOTE — PROGRESS NOTES
[facility]    Post-Partum Progress Note    Assessment & Plan   Assessment:  Post-partum day #1  Normal spontaneous vaginal delivery    Doing well.  No excessive bleeding  Pain well-controlled.    Plan:  Ambulation encouraged  Hemoglobin in AM  Lactation consultation  Pain control measures as needed  Reportable signs and symptoms dicussed with the patient  Possible discharge later today --would like to see how feedings go; o/w discharge tomorrow.  Follow up with me in 6 weeks    Sindhu Middleton     Interval History   Doing well.  Pain is well-controlled.  No fevers.  No history of foul-smelling vaginal discharge.  Good appetite.  Denies chest pain, shortness of breath, nausea or vomiting.  Vaginal bleeding is similar to a heavy menstrual flow.  Ambulatory.  Breastfeeding fairly well =--a bit sleepy this AM.  Otherwise had a good night.  Mild cramping.  +soreness at stitches. Using restroom well    Medications     - MEDICATION INSTRUCTIONS -       NO Rho (D) immune globulin (RhoGam) needed - mother Rh POSITIVE       - MEDICATION INSTRUCTIONS -       oxytocin in 0.9% NaCl 100 mL/hr (06/05/21 1632)     oxytocin in 0.9% NaCl 340 mL/hr (06/05/21 1601)       senna-docusate  1 tablet Oral BID    Or     senna-docusate  2 tablet Oral BID       Physical Exam   Temp: 97.4  F (36.3  C) Temp src: Oral BP: 112/68 Pulse: 67   Resp: 16 SpO2: 98 %      There were no vitals filed for this visit.  Vital Signs with Ranges  Temp:  [97  F (36.1  C)-99.4  F (37.4  C)] 97.4  F (36.3  C)  Pulse:  [67-85] 67  Resp:  [16-17] 16  BP: ()/(46-72) 112/68  SpO2:  [93 %-100 %] 98 %  I/O last 3 completed shifts:  In: 151 [I.V.:151]  Out: 1700 [Urine:1300; Blood:400]    Uterine fundus is firm, non-tender and at the level of the umbilicus  Extremities Non-tender    Data   Recent Labs   Lab Test 06/05/21  0811   ABO O   RH Pos   AS Neg     Recent Labs   Lab Test 06/06/21  0734 06/05/21  0811   HGB 10.1* 11.7     Recent  Labs   Lab Test 11/24/20  1127   RUQIGG 22

## 2021-06-06 NOTE — LACTATION NOTE
Routine Lactation visit with Poonam, significant other Chino & baby girl Russel. Poonam reports feeding is not going well so far. She shared baby latched on more readily last evening, was a bit sleepier overnight, and Poonam pumped this AM and got some EBM. Poonam shared latching has been very painful, nipples are bruised and scabbed. She's using her own nipple cream after each feeding. Discussed using hydrogels (she also brought her own Lansinoh hydrogels from home) since nipples have scabbing.  Reviewed use, including but not limited to: using hydrogels or nipple ointment and avoiding concurrent use; use for 24 hours and then discarding set and replacing as needed; discarding hydrogels prior to 24 hours if they become cloudy or discolored; storing opened hydrogels in clean dry location. Encouraged good hand hygeine. Encouraged wiping nipples prior to feeding with clean cloth. Reviewed signs of further nipple damage or infection and encouraged lactation follow up as needed.     At time of visit, baby due to feed. LC assisted to attempt to wake sleepy baby for feeding. Undressed and noted baby was a bit gaggy. Reviewed ways to wake a sleepy baby but baby remained sleepy, disinterested in sucking. LC attempted to assess suck, noted baby with heart shape to tongue when lifting, but would not suck on finger so unable to assess suck well. Wiped about 4ml of colostrum into baby's mouth.     Reviewed milk supply and engorgement. General questions answered regarding pumping, when it's helpful and necessary, general recommendation to wait to start pumping until breastfeeding is well established. Discussed introducing a bottle and recommendation to wait for bottle introduction for 3-4 weeks unless baby needs to supplement for medical reasons. Breastfeeding section reviewed in Your Guide to Postpartum & [place] [facility]. Discussed typical  feeding patterns, cluster feeding, and ways to wake a sleepy baby for  feedings.    Feeding plan: Recommend unlimited, frequent breast feedings: At least 8 - 12 times every 24 hours. Use nipple shield as needed to get a deep latch. If baby too sleepy to latch well, Poonam to pump and give any EBM via cup/spoon. Avoid pacifiers and supplementation with formula unless medically indicated. Encouraged use of feeding log and to record feedings, and void/stool patterns. Poonam has a pump for home use.  Encouraged to call with needs, will revisit as needed. Poonam Magana appreciative of visit.    Anat Alexander, RN-C, IBCLC, MNN, PHN, BSN

## 2021-06-06 NOTE — PLAN OF CARE
VSS, breastfeeding intermittently w/ shield, fundus is firm at U/1, scant flow, no clots.  Pain controlled with Tylenol, Ibuprofen, ice packs and tucks pads.  Independent with cares.   is at bedside and supportive.  Will continue to monitor and support.

## 2021-06-06 NOTE — ANESTHESIA POSTPROCEDURE EVALUATION
Patient: Poonam Urban    * No procedures listed *    Diagnosis:* No pre-op diagnosis entered *  Diagnosis Additional Information: No value filed.    Anesthesia Type:  Epidural    Note:  Disposition: Inpatient   Postop Pain Control: Uneventful            Sign Out: Well controlled pain   PONV: No   Neuro/Psych: Uneventful            Sign Out: Acceptable/Baseline neuro status   Airway/Respiratory: Uneventful            Sign Out: Acceptable/Baseline resp. status   CV/Hemodynamics: Uneventful            Sign Out: Acceptable CV status; No obvious hypovolemia; No obvious fluid overload   Other NRE: NONE   DID A NON-ROUTINE EVENT OCCUR? No    Event details/Postop Comments:  Anesthesia Post-Op Evaluation    Patient seen following delivery. Doing well without complaints. Satisfied with epidural analgesia. Epidural was removed by RN. Denies residual numbness or motor weakness. Able to void. Currently denies nausea, vomiting and tolerating orals. No anesthetic complications.     Kofi Cazares D.O.  Staff Anesthesiologist  [facility], [facility]             Last vitals:  Vitals:    06/05/21 2224 06/06/21 0240 06/06/21 0900   BP: 113/59 112/68 92/54   Pulse: 83 67 74   Resp: 16 16 16   Temp: 36.7  C (98.1  F) 36.3  C (97.4  F) 36.7  C (98.1  F)   SpO2:          Last vitals prior to Anesthesia Care Transfer:      Electronically Signed By: Kofi Cazares DO  June 6, 2021  2:05 PM

## 2021-06-06 NOTE — PLAN OF CARE
Patient, , and  admitted to room 429 from L&D. Report received from Jeninfer BELL RN and  ID bands verified. Patient and  oriented to room, call light use, new patient folder/paperwork, and infant safety/security. Fundal assessment completed and WNL. Encouraged to call RN with needs, will continue to monitor.

## 2021-06-07 VITALS
DIASTOLIC BLOOD PRESSURE: 60 MMHG | HEART RATE: 78 BPM | OXYGEN SATURATION: 98 % | RESPIRATION RATE: 18 BRPM | TEMPERATURE: 97.9 F | SYSTOLIC BLOOD PRESSURE: 94 MMHG

## 2021-06-07 PROCEDURE — 250N000013 HC RX MED GY IP 250 OP 250 PS 637: Performed by: OBSTETRICS & GYNECOLOGY

## 2021-06-07 RX ADMIN — IBUPROFEN 800 MG: 400 TABLET ORAL at 06:41

## 2021-06-07 RX ADMIN — IBUPROFEN 800 MG: 400 TABLET ORAL at 13:04

## 2021-06-07 RX ADMIN — ACETAMINOPHEN 650 MG: 325 TABLET, FILM COATED ORAL at 13:04

## 2021-06-07 RX ADMIN — ACETAMINOPHEN 650 MG: 325 TABLET, FILM COATED ORAL at 06:42

## 2021-06-07 RX ADMIN — IBUPROFEN 800 MG: 400 TABLET ORAL at 00:05

## 2021-06-07 RX ADMIN — ACETAMINOPHEN 650 MG: 325 TABLET, FILM COATED ORAL at 00:06

## 2021-06-07 RX ADMIN — SENNOSIDES AND DOCUSATE SODIUM 1 TABLET: 8.6; 5 TABLET ORAL at 08:14

## 2021-06-07 NOTE — PLAN OF CARE
VSS on RA.  Fundus firm, midline, U/1.  Scant lochia rubra.  Pt is able to empty bladder independently.  Voiding adequately.  Up independently.  Pain managed w/Tylenol and Ibuprofen.  Pt breastfeeding independently.  Bonding well w/infant.  Independent w/infant cares.  Spouse supportive at bedside.  Nursing to continue to monitor.

## 2021-06-07 NOTE — LACTATION NOTE
This note was copied from a baby's chart.  Return visit to assist with feeding. Infant brought to left breast in cross cradle hold; assisted with asymmetric latch and she latches well to breast. Deep latch with nutritive suckling pattern; she suckles at left breast for 15-20 minutes before slowing down. Infant detached, burped, and right breast offered. She falls asleep and is uninterested.     Review normal weight loss; goal  Of back to birth weight by 10-14 days. To continue offering 8 or more feedings in 24 hours and monitor voids/stools in booklet.    Appreciative of visit.    Tere Newman, RN, IBCLC

## 2021-06-07 NOTE — PLAN OF CARE
Vital signs stable. Postpartum assessment WDL. Pain well controlled with Tylenol and Ibuprofen. Up and ambulating; free of dizziness. Breastfeeding fair with a nipple shield. Pumping. Sore nipples. Using lanolin. Has hydrogels if needed. Will continue to monitor. Questions/concerns addressed.

## 2021-06-07 NOTE — PROGRESS NOTES
S: Pt doing well. Infant is being . Pain is well controlled.    O: /67   Pulse 68   Temp 97.4  F (36.3  C) (Oral)   Resp 16   LMP 09/08/2020 (Exact Date)   SpO2 98%   Breastfeeding Unknown         ABD:  Uterine fundus is firm, non-tender and at the level of the umbilicus                Hemoglobin   Date Value Ref Range Status   06/06/2021 10.1 (L) 11.7 - 15.7 g/dL Final            Lab Results   Component Value Date    RH Pos 06/05/2021       A:  Post-partum day #2 s/p Normal spontaneous vaginal delivery    P: Continue PP Cares       discharge

## 2021-06-07 NOTE — PLAN OF CARE
Doing well,vss,voiding with out difficulty,pain control with tylenol&ibuprofen,baby breast feeding well with nipple shield,using nipple cream for tender nipple,plan to discharge today.

## 2021-06-07 NOTE — LACTATION NOTE
Routine visit prior to discharge.  Per Poonam, feeding sessions have improved. Infant seems to be latching better with the nipple shield; her lower lip has been staying down better. Poonam has a pump at bedside but states that she hasn't been using this as frequently, as feeding sessions have been going better. Poonam's nipples reddened and tender; using nipple balm. No blisters or cracks noted. She has a pump for home use. Requesting assistance with next feeding; would like to try feeding without nipple shield one more time prior to discharge.     Will await call to assist with feeding.    Tere Newman RN, IBCLC

## 2021-06-08 ENCOUNTER — MEDICAL CORRESPONDENCE (OUTPATIENT)
Dept: HEALTH INFORMATION MANAGEMENT | Facility: CLINIC | Age: 31
End: 2021-06-08

## 2021-07-20 ENCOUNTER — PRENATAL OFFICE VISIT (OUTPATIENT)
Dept: OBGYN | Facility: CLINIC | Age: 31
End: 2021-07-20
Payer: COMMERCIAL

## 2021-07-20 VITALS — WEIGHT: 188 LBS | SYSTOLIC BLOOD PRESSURE: 118 MMHG | DIASTOLIC BLOOD PRESSURE: 70 MMHG | BODY MASS INDEX: 29.89 KG/M2

## 2021-07-20 PROBLEM — Z34.80 SUPERVISION OF NORMAL INTRAUTERINE PREGNANCY IN MULTIGRAVIDA: Status: RESOLVED | Noted: 2020-11-10 | Resolved: 2021-07-20

## 2021-07-20 PROCEDURE — 99207 PR POST PARTUM EXAM: CPT | Performed by: OBSTETRICS & GYNECOLOGY

## 2021-07-20 ASSESSMENT — ANXIETY QUESTIONNAIRES
3. WORRYING TOO MUCH ABOUT DIFFERENT THINGS: NOT AT ALL
2. NOT BEING ABLE TO STOP OR CONTROL WORRYING: NOT AT ALL
6. BECOMING EASILY ANNOYED OR IRRITABLE: NOT AT ALL
IF YOU CHECKED OFF ANY PROBLEMS ON THIS QUESTIONNAIRE, HOW DIFFICULT HAVE THESE PROBLEMS MADE IT FOR YOU TO DO YOUR WORK, TAKE CARE OF THINGS AT HOME, OR GET ALONG WITH OTHER PEOPLE: NOT DIFFICULT AT ALL
1. FEELING NERVOUS, ANXIOUS, OR ON EDGE: NOT AT ALL
7. FEELING AFRAID AS IF SOMETHING AWFUL MIGHT HAPPEN: NOT AT ALL
GAD7 TOTAL SCORE: 0
5. BEING SO RESTLESS THAT IT IS HARD TO SIT STILL: NOT AT ALL

## 2021-07-20 ASSESSMENT — PATIENT HEALTH QUESTIONNAIRE - PHQ9
5. POOR APPETITE OR OVEREATING: NOT AT ALL
SUM OF ALL RESPONSES TO PHQ QUESTIONS 1-9: 0

## 2021-07-20 NOTE — PATIENT INSTRUCTIONS
Follow up with your primary care provider for your other medical problems.  Continue self breast exam.  Increase physical activity and exercise.  Usual safety and preventative measures counseling done.  Last pap smear (2020) was normal.  No pap was obtained this year.  This was discussed with the patient and she agrees with the plan.

## 2021-07-20 NOTE — PROGRESS NOTES
SUBJECTIVE:  Poonam Urban,  is here for a postpartum visit.  She had a  on 21 delivering a healthy baby girl, named Russel weighing 7 lbs 1 oz at term.      HPI:  Here today for postpartum visit --doing well.  Baby Russel has been doing well --good eater and sleeper.  Has been primarily breast feeding --occ small amounts of supplementation.  Going 3-4hrs between nighttime feedings.    Poonam is doing well.   Eating/drinking well.  No bowel/bladder issues.  Stopped bleeding after ~3-4wks.  Has not resumed SA.  Has used OCPs in the past --hx PCOS and would only have periods every 3 months or so.  Leaning towards condoms for now and seeing if/when her menses return after completing breast feeding.   Moods good --denies depression or anxiety  Returns to work in 6 weeks      Last PHQ-9 score on record=   PHQ-9 SCORE 2020   PHQ-9 Total Score MyChart 1 (Minimal depression)   PHQ-9 Total Score 1     SONIA-7 SCORE 2020   Total Score - 0 (minimal anxiety)   Total Score 0 0       Pap:   Lab Results   Component Value Date    PAP NIL 2020    PAP NIL 2017    PAP NIL 2013 WNL HPV (-)neg    Delivery complications:  No  Breast feeding:  Yes  Bladder problems:  No  Bowel problems/hemorrhoids:  No  Episiotomy/laceration/incision healed? Yes  Vaginal flow:  None  [place]:  No  Contraception: unsure  Emotional adjustment:  doing well and happy  Back to work:  Goes back to work after 12 weeks    12 point review of systems negative other than symptoms noted below or in the HPI.    OBJECTIVE:  Vitals: /70   Wt 85.3 kg (188 lb)   LMP 2020 (Exact Date)   Breastfeeding Yes   BMI 29.89 kg/m    BMI= Body mass index is 29.89 kg/m .  General - pleasant female in no acute distress.  Breast -  deferred  Abdomen - No incision  Pelvic - EG: normal adult female, BUS: within normal limits, Vagina: well rugated, no discharge, Cervix: no lesions or CMT, Uterus: firm,  normal sized and nontender, midplane in position. Adnexae: no masses or tenderness.  Vagina/perineum --well healed; small amounts of granulation tissue along stitches line  Rectovaginal - deferred.    ASSESSMENT:    ICD-10-CM    1. Encounter for postpartum visit  Z39.2        PLAN:  May resume normal activities without restrictions.  Pap smear was not done today.    Full counseling was provided, and all questions were answered.   Return to clinic in one year for an annual visit.     Patient Instructions   Follow up with your primary care provider for your other medical problems.  Continue self breast exam.  Increase physical activity and exercise.  Usual safety and preventative measures counseling done.  Last pap smear (2020) was normal.  No pap was obtained this year.  This was discussed with the patient and she agrees with the plan.    Sindhu Middleton MD

## 2021-07-21 ASSESSMENT — ANXIETY QUESTIONNAIRES: GAD7 TOTAL SCORE: 0

## 2022-12-06 ENCOUNTER — PRENATAL OFFICE VISIT (OUTPATIENT)
Dept: NURSING | Facility: CLINIC | Age: 32
End: 2022-12-06
Payer: COMMERCIAL

## 2022-12-06 DIAGNOSIS — Z34.81 SUPERVISION OF NORMAL INTRAUTERINE PREGNANCY IN MULTIGRAVIDA IN FIRST TRIMESTER: ICD-10-CM

## 2022-12-06 DIAGNOSIS — O36.80X0 PREGNANCY WITH INCONCLUSIVE FETAL VIABILITY: Primary | ICD-10-CM

## 2022-12-06 PROCEDURE — 99207 PR NO CHARGE NURSE ONLY: CPT

## 2022-12-06 NOTE — PROGRESS NOTES
SUBJECTIVE:     HPI:    This is a 32 year old female patient,  who presents for her first obstetrical visit.    NADIA: 2023, by Last Menstrual Period.  She is 8w6d weeks.  Her cycles are regular.  Her last menstrual period was normal.   Since her LMP, she has experienced  nausea, emesis and fatigue).   She denies abdominal pain, headache, loss of appetite, vaginal discharge, dysuria, pelvic pain, urinary urgency, lightheadedness, urinary frequency, vaginal bleeding, hemorrhoids and constipation.    Additional History:   -2nd pregnancy, hx of term vaginal delivery  -pt unsure about NIPS, states with last pregnancy had $700-800 bill, educated patient that we are with new company with $99 max out of pocket fees. No order placed at this time.   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      She has a history of  History of term vaginal delivery    Since her last LMP she denies use of alcohol, tobacco and street drugs.    Past medical, surgical, social and family history were reviewed and updated in [facility].    Current Outpatient Medications   Medication     Prenatal Vit-Fe Fumarate-FA (PRENATAL MULTIVITAMIN W/IRON) 27-0.8 MG tablet     No current facility-administered medications for this visit.       ROS:   12 point review of systems negative other than symptoms noted below or in the HPI.  Constitutional: Fatigue  Gastrointestinal: Nausea and Vomiting     Nurse phone visit completed. Prenatal book and folder (containing standard educational hand-outs and brochures) will be given to patient. Information in folder  reviewed over the phone. Questions answered. Brochure given on optional screening available to assess chromosomal anomalies. Pt declines NIPS at this time. Pt advised to call the clinic if she has any questions or concerns related to her pregnancy. Prenatal labs future ordered. New prenatal visit scheduled on 12/15/22 with Dr. Middleton.  Julia Mckeon, RN on 12/6/2022 at 1:34 PM      Lab Results   Component Value Date    PAP NIL 08/17/2020     PHQ-9 score:    PHQ 12/6/2022   PHQ-9 Total Score 0   Q9: Thoughts of better off dead/self-harm past 2 weeks Not at all     SONIA-7 SCORE 11/9/2020 7/20/2021 12/6/2022   Total Score 0 (minimal anxiety) - 0 (minimal anxiety)   Total Score 0 0 0     Patient supplied answers from flow sheet for:  Prenatal OB Questionnaire.  Past Medical History  Have you ever recieved care for your mental health? : No  Have you ever been in a major accident or suffered serious trauma?: No  Within the last year, has anyone hit, slapped, kicked or otherwise hurt you?: No  In the last year, has anyone forced you to have sex when you didn't want to?: No    Past Medical History 2   Have you ever received a blood transfusion?: No  Would you accept a blood transfusion if was medically recommended?: Yes  Does anyone in your home smoke?: No   Is your blood type Rh negative?: Unknown  Have you ever ?: (!) Yes  Have you been hospitalized for a nonsurgical reason excluding normal delivery?: No  Have you ever had an abnormal pap smear?: (!) Yes    Past Medical History (Continued)  Do you have a history of abnormalities of the uterus?: No  Did your mother take EWA or any other hormones when she was pregnant with you?: Unknown  Do you have any other problems we have not asked about which you feel may be important to this pregnancy?: No

## 2022-12-07 NOTE — PROGRESS NOTES
"Answers for HPI/ROS submitted by the patient on 2022  If you checked off any problems, how difficult have these problems made it for you to do your work, take care of things at home, or get along with other people?: Not difficult at all  PHQ9 TOTAL SCORE: 0       HPI:     This is a 32 year old female patient,  who presents for her first obstetrical visit.     NADIA: 2023, by Last Menstrual Period.  She is 8w6d weeks.  Her cycles are regular.  Her last menstrual period was normal.   Since her LMP, she has experienced  nausea, emesis and fatigue).   She denies abdominal pain, headache, loss of appetite, vaginal discharge, dysuria, pelvic pain, urinary urgency, lightheadedness, urinary frequency, vaginal bleeding, hemorrhoids and constipation.     Additional History:   -2nd pregnancy, hx of term vaginal delivery  -pt unsure about NIPS, states with last pregnancy had $700-800 bill, educated patient that we are with new company with $99 max out of pocket fees. No order placed at this time.   -COVID + in May, no booster since but had initial series/1st booster in     New OB visit --2nd pregnancy.  Sure LMP and us today confirms dating.  No vb/spotting.  Rare pelvic cramping/\"growing pains\"  Mild nausea in later afternoon/evenings.  No emesis.  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       She has a history of  History of term vaginal delivery     Since her last LMP she " denies use of alcohol, tobacco and street drugs.     Past medical, surgical, social and family history were reviewed and updated in [facility].         Current Outpatient Medications   Medication     Prenatal Vit-Fe Fumarate-FA (PRENATAL MULTIVITAMIN W/IRON) 27-0.8 MG tablet      No current facility-administered medications for this visit.         ROS:   12 point review of systems negative other than symptoms noted below or in the HPI.  Constitutional: Fatigue  Gastrointestinal: Nausea and Vomiting      Nurse phone visit completed. Prenatal book and folder (containing standard educational hand-outs and brochures) will be given to patient. Information in folder reviewed over the phone. Questions answered. Brochure given on optional screening available to assess chromosomal anomalies. Pt declines NIPS at this time. Pt advised to call the clinic if she has any questions or concerns related to her pregnancy. Prenatal labs future ordered. New prenatal visit scheduled on 12/15/22 with Dr. Middleton.  Julia Mckeon RN on 12/6/2022 at 1:34 PM              Lab Results   Component Value Date     PAP NIL 08/17/2020      PHQ-9 score:    PHQ 12/6/2022   PHQ-9 Total Score 0   Q9: Thoughts of better off dead/self-harm past 2 weeks Not at all      SONIA-7 SCORE 11/9/2020 7/20/2021 12/6/2022   Total Score 0 (minimal anxiety) - 0 (minimal anxiety)   Total Score 0 0 0      Patient supplied answers from flow sheet for:  Prenatal OB Questionnaire.  Past Medical History  Have you ever recieved care for your mental health? : No  Have you ever been in a major accident or suffered serious trauma?: No  Within the last year, has anyone hit, slapped, kicked or otherwise hurt you?: No  In the last year, has anyone forced you to have sex when you didn't want to?: No     Past Medical History 2   Have you ever received a blood transfusion?: No  Would you accept a blood transfusion if was medically recommended?: Yes  Does anyone in your home smoke?:  "No   Is your blood type Rh negative?: Unknown  Have you ever ?: (!) Yes  Have you been hospitalized for a nonsurgical reason excluding normal delivery?: No  Have you ever had an abnormal pap smear?: (!) Yes     Past Medical History (Continued)  Do you have a history of abnormalities of the uterus?: No  Did your mother take EWA or any other hormones when she was pregnant with you?: Unknown  Do you have any other problems we have not asked about which you feel may be important to this pregnancy?: No         OBJECTIVE:     EXAM:  Ht 1.676 m (5' 6\")   Wt 86.6 kg (191 lb)   LMP 10/05/2022 (Exact Date)   BMI 30.83 kg/m   Body mass index is 30.83 kg/m .    GENERAL: healthy, alert and no distress  EYES: Eyes grossly normal to inspection, PERRL and conjunctivae and sclerae normal  HENT: ear canals and TM's normal, nose and mouth without ulcers or lesions  NECK: no adenopathy, no asymmetry, masses, or scars and thyroid normal to palpation  RESP: lungs clear to auscultation - no rales, rhonchi or wheezes  BREAST: normal without masses, tenderness or nipple discharge and no palpable axillary masses or adenopathy  CV: regular rate and rhythm, normal S1 S2, no S3 or S4, no murmur, click or rub, no peripheral edema and peripheral pulses strong  ABDOMEN: soft, nontender, no hepatosplenomegaly, no masses and bowel sounds normal  MS: no gross musculoskeletal defects noted, no edema  SKIN: no suspicious lesions or rashes  NEURO: Normal strength and tone, mentation intact and speech normal  PSYCH: mentation appears normal, affect normal/bright    ASSESSMENT/PLAN:       ICD-10-CM    1. Supervision of normal intrauterine pregnancy in multigravida in first trimester  Z34.81 Urine Culture Aerobic Bacterial     *UA reflex to Microscopic          32 year old , On December 15, 2022 patient is 10w1d weeks of pregnancy with NADIA of 2023, by Last Menstrual Period    Discussed as follows:vaccinations, genetic " screening    Counseling given:   - Follow up in 4-6 weeks for return OB visit.  - Recommended weight gain for pregnancy: 25-35 lbs.      PLAN/PATIENT INSTRUCTIONS:    Patient Instructions   Avoid alcoholic beverages.  Discussed all genetic testing options.  Patient interested in invitae testing today.  Reviewed usual and expected course of pregnancy including labwork, imaging, vaccinations, etc.  RTC 4wks.  Covid booster today        Sindhu Middleton MD

## 2022-12-08 ASSESSMENT — PATIENT HEALTH QUESTIONNAIRE - PHQ9
10. IF YOU CHECKED OFF ANY PROBLEMS, HOW DIFFICULT HAVE THESE PROBLEMS MADE IT FOR YOU TO DO YOUR WORK, TAKE CARE OF THINGS AT HOME, OR GET ALONG WITH OTHER PEOPLE: NOT DIFFICULT AT ALL
SUM OF ALL RESPONSES TO PHQ QUESTIONS 1-9: 0
SUM OF ALL RESPONSES TO PHQ QUESTIONS 1-9: 0

## 2022-12-14 LAB
ABO/RH(D): NORMAL
ANTIBODY SCREEN: NEGATIVE
SPECIMEN EXPIRATION DATE: NORMAL

## 2022-12-15 ENCOUNTER — ANCILLARY PROCEDURE (OUTPATIENT)
Dept: ULTRASOUND IMAGING | Facility: CLINIC | Age: 32
End: 2022-12-15
Payer: COMMERCIAL

## 2022-12-15 ENCOUNTER — PRENATAL OFFICE VISIT (OUTPATIENT)
Dept: OBGYN | Facility: CLINIC | Age: 32
End: 2022-12-15
Payer: COMMERCIAL

## 2022-12-15 VITALS
WEIGHT: 191 LBS | BODY MASS INDEX: 30.7 KG/M2 | HEIGHT: 66 IN | DIASTOLIC BLOOD PRESSURE: 76 MMHG | SYSTOLIC BLOOD PRESSURE: 114 MMHG

## 2022-12-15 DIAGNOSIS — Z13.79 GENETIC SCREENING: Primary | ICD-10-CM

## 2022-12-15 DIAGNOSIS — Z34.81 SUPERVISION OF NORMAL INTRAUTERINE PREGNANCY IN MULTIGRAVIDA IN FIRST TRIMESTER: ICD-10-CM

## 2022-12-15 DIAGNOSIS — Z23 HIGH PRIORITY FOR 2019-NCOV VACCINE: ICD-10-CM

## 2022-12-15 DIAGNOSIS — O36.80X0 PREGNANCY WITH INCONCLUSIVE FETAL VIABILITY: ICD-10-CM

## 2022-12-15 LAB
ALBUMIN UR-MCNC: NEGATIVE MG/DL
APPEARANCE UR: CLEAR
BILIRUB UR QL STRIP: NEGATIVE
COLOR UR AUTO: YELLOW
ERYTHROCYTE [DISTWIDTH] IN BLOOD BY AUTOMATED COUNT: 12.6 % (ref 10–15)
GLUCOSE UR STRIP-MCNC: NEGATIVE MG/DL
HBV SURFACE AG SERPL QL IA: NONREACTIVE
HCT VFR BLD AUTO: 36.1 % (ref 35–47)
HCV AB SERPL QL IA: NONREACTIVE
HGB BLD-MCNC: 12.2 G/DL (ref 11.7–15.7)
HGB UR QL STRIP: NEGATIVE
HIV 1+2 AB+HIV1 P24 AG SERPL QL IA: NONREACTIVE
KETONES UR STRIP-MCNC: NEGATIVE MG/DL
LEUKOCYTE ESTERASE UR QL STRIP: NEGATIVE
MCH RBC QN AUTO: 30.1 PG (ref 26.5–33)
MCHC RBC AUTO-ENTMCNC: 33.8 G/DL (ref 31.5–36.5)
MCV RBC AUTO: 89 FL (ref 78–100)
NITRATE UR QL: NEGATIVE
PH UR STRIP: 7 [PH] (ref 5–7)
PLATELET # BLD AUTO: 248 10E3/UL (ref 150–450)
RBC # BLD AUTO: 4.05 10E6/UL (ref 3.8–5.2)
SP GR UR STRIP: 1.01 (ref 1–1.03)
UROBILINOGEN UR STRIP-ACNC: 0.2 E.U./DL
WBC # BLD AUTO: 10.8 10E3/UL (ref 4–11)

## 2022-12-15 PROCEDURE — 86901 BLOOD TYPING SEROLOGIC RH(D): CPT | Performed by: OBSTETRICS & GYNECOLOGY

## 2022-12-15 PROCEDURE — 36415 COLL VENOUS BLD VENIPUNCTURE: CPT | Performed by: OBSTETRICS & GYNECOLOGY

## 2022-12-15 PROCEDURE — 86780 TREPONEMA PALLIDUM: CPT | Performed by: OBSTETRICS & GYNECOLOGY

## 2022-12-15 PROCEDURE — 87340 HEPATITIS B SURFACE AG IA: CPT | Performed by: OBSTETRICS & GYNECOLOGY

## 2022-12-15 PROCEDURE — 87086 URINE CULTURE/COLONY COUNT: CPT | Performed by: OBSTETRICS & GYNECOLOGY

## 2022-12-15 PROCEDURE — 85027 COMPLETE CBC AUTOMATED: CPT | Performed by: OBSTETRICS & GYNECOLOGY

## 2022-12-15 PROCEDURE — 86850 RBC ANTIBODY SCREEN: CPT | Performed by: OBSTETRICS & GYNECOLOGY

## 2022-12-15 PROCEDURE — 86803 HEPATITIS C AB TEST: CPT | Performed by: OBSTETRICS & GYNECOLOGY

## 2022-12-15 PROCEDURE — 76801 OB US < 14 WKS SINGLE FETUS: CPT | Performed by: OBSTETRICS & GYNECOLOGY

## 2022-12-15 PROCEDURE — 0124A COVID-19 VACCINE BIVALENT BOOSTER 12+ (PFIZER): CPT | Performed by: OBSTETRICS & GYNECOLOGY

## 2022-12-15 PROCEDURE — 87389 HIV-1 AG W/HIV-1&-2 AB AG IA: CPT | Performed by: OBSTETRICS & GYNECOLOGY

## 2022-12-15 PROCEDURE — 86900 BLOOD TYPING SEROLOGIC ABO: CPT | Performed by: OBSTETRICS & GYNECOLOGY

## 2022-12-15 PROCEDURE — 81003 URINALYSIS AUTO W/O SCOPE: CPT | Performed by: OBSTETRICS & GYNECOLOGY

## 2022-12-15 PROCEDURE — 86762 RUBELLA ANTIBODY: CPT | Performed by: OBSTETRICS & GYNECOLOGY

## 2022-12-15 PROCEDURE — 99207 PR FIRST OB VISIT: CPT | Performed by: OBSTETRICS & GYNECOLOGY

## 2022-12-15 PROCEDURE — 91312 COVID-19 VACCINE BIVALENT BOOSTER 12+ (PFIZER): CPT | Performed by: OBSTETRICS & GYNECOLOGY

## 2022-12-15 NOTE — PATIENT INSTRUCTIONS
Avoid alcoholic beverages.  Discussed all genetic testing options.  Patient interested in invitae testing today.  Reviewed usual and expected course of pregnancy including labwork, imaging, vaccinations, etc.  RTC 4wks.  Covid booster today

## 2022-12-16 LAB
RUBV IGG SERPL QL IA: 1.65 INDEX
RUBV IGG SERPL QL IA: POSITIVE
T PALLIDUM AB SER QL: NONREACTIVE

## 2022-12-17 LAB — BACTERIA UR CULT: NORMAL

## 2022-12-22 LAB — SCANNED LAB RESULT: NORMAL

## 2022-12-22 NOTE — RESULT ENCOUNTER NOTE
LDVM, NIPS WNL, gender disclosed in mychart. Please don't hesitate to reach out with any further questions or concerns.  Henrique St RN on 12/22/2022 at 5:01 PM

## 2023-01-13 ENCOUNTER — PRENATAL OFFICE VISIT (OUTPATIENT)
Dept: OBGYN | Facility: CLINIC | Age: 33
End: 2023-01-13
Payer: COMMERCIAL

## 2023-01-13 VITALS — SYSTOLIC BLOOD PRESSURE: 98 MMHG | BODY MASS INDEX: 30.83 KG/M2 | DIASTOLIC BLOOD PRESSURE: 70 MMHG | WEIGHT: 191 LBS

## 2023-01-13 DIAGNOSIS — Z34.82 SUPERVISION OF NORMAL INTRAUTERINE PREGNANCY IN MULTIGRAVIDA IN SECOND TRIMESTER: Primary | ICD-10-CM

## 2023-01-13 DIAGNOSIS — Z34.81 SUPERVISION OF NORMAL INTRAUTERINE PREGNANCY IN MULTIGRAVIDA IN FIRST TRIMESTER: ICD-10-CM

## 2023-01-13 DIAGNOSIS — Z3A.14 14 WEEKS GESTATION OF PREGNANCY: ICD-10-CM

## 2023-01-13 PROCEDURE — 99207 PR PRENATAL VISIT: CPT | Performed by: OBSTETRICS & GYNECOLOGY

## 2023-01-13 NOTE — PROGRESS NOTES
Doing well today.    NO issues/concerns  No cramping/vb/spotting  No bowel/bladder issues  US for heart tones today  RTC 4wks

## 2023-02-10 ENCOUNTER — PRENATAL OFFICE VISIT (OUTPATIENT)
Dept: OBGYN | Facility: CLINIC | Age: 33
End: 2023-02-10
Payer: COMMERCIAL

## 2023-02-10 VITALS — WEIGHT: 193 LBS | SYSTOLIC BLOOD PRESSURE: 98 MMHG | DIASTOLIC BLOOD PRESSURE: 50 MMHG | BODY MASS INDEX: 31.15 KG/M2

## 2023-02-10 DIAGNOSIS — Z36.1 ANTENATAL SCREENING FOR RAISED ALPHAFETOPROTEIN LEVEL: ICD-10-CM

## 2023-02-10 DIAGNOSIS — Z3A.18 18 WEEKS GESTATION OF PREGNANCY: ICD-10-CM

## 2023-02-10 DIAGNOSIS — Z34.82 SUPERVISION OF NORMAL INTRAUTERINE PREGNANCY IN MULTIGRAVIDA IN SECOND TRIMESTER: Primary | ICD-10-CM

## 2023-02-10 PROCEDURE — 99207 PR PRENATAL VISIT: CPT | Performed by: OBSTETRICS & GYNECOLOGY

## 2023-02-10 PROCEDURE — 36415 COLL VENOUS BLD VENIPUNCTURE: CPT | Performed by: OBSTETRICS & GYNECOLOGY

## 2023-02-10 PROCEDURE — 99000 SPECIMEN HANDLING OFFICE-LAB: CPT | Performed by: OBSTETRICS & GYNECOLOGY

## 2023-02-10 PROCEDURE — 82105 ALPHA-FETOPROTEIN SERUM: CPT | Mod: 90 | Performed by: OBSTETRICS & GYNECOLOGY

## 2023-02-10 NOTE — PROGRESS NOTES
Doing well today.  +flutters --fairly inconsistent  No cramping/vb/spotting/lof  A busy last month as in home  provider broke her leg --just back today  No bowel/bladder issues  Normal invitae; AFP only today  RTC 2wks --anatomy us at that time

## 2023-02-12 LAB
# FETUSES US: NORMAL
AFP MOM SERPL: 1.05
AFP SERPL-MCNC: 41 NG/ML
AGE - REPORTED: 32.9 YR
CURRENT SMOKER: NO
FAMILY MEMBER DISEASES HX: NO
GA METHOD: NORMAL
GA: NORMAL WK
IDDM PATIENT QL: NO
INTEGRATED SCN PATIENT-IMP: NORMAL
SPECIMEN DRAWN SERPL: NORMAL

## 2023-02-28 ENCOUNTER — PRENATAL OFFICE VISIT (OUTPATIENT)
Dept: OBGYN | Facility: CLINIC | Age: 33
End: 2023-02-28
Payer: COMMERCIAL

## 2023-02-28 ENCOUNTER — ANCILLARY PROCEDURE (OUTPATIENT)
Dept: ULTRASOUND IMAGING | Facility: CLINIC | Age: 33
End: 2023-02-28
Payer: COMMERCIAL

## 2023-02-28 VITALS — DIASTOLIC BLOOD PRESSURE: 62 MMHG | SYSTOLIC BLOOD PRESSURE: 118 MMHG | BODY MASS INDEX: 31.64 KG/M2 | WEIGHT: 196 LBS

## 2023-02-28 DIAGNOSIS — Z3A.20 20 WEEKS GESTATION OF PREGNANCY: ICD-10-CM

## 2023-02-28 DIAGNOSIS — Z34.82 SUPERVISION OF NORMAL INTRAUTERINE PREGNANCY IN MULTIGRAVIDA IN SECOND TRIMESTER: ICD-10-CM

## 2023-02-28 DIAGNOSIS — Z34.82 SUPERVISION OF NORMAL INTRAUTERINE PREGNANCY IN MULTIGRAVIDA IN SECOND TRIMESTER: Primary | ICD-10-CM

## 2023-02-28 DIAGNOSIS — O35.EXX0 PYELECTASIS OF FETUS ON PRENATAL ULTRASOUND: ICD-10-CM

## 2023-02-28 PROCEDURE — 76805 OB US >/= 14 WKS SNGL FETUS: CPT | Performed by: OBSTETRICS & GYNECOLOGY

## 2023-02-28 PROCEDURE — 99207 PR PRENATAL VISIT: CPT | Performed by: OBSTETRICS & GYNECOLOGY

## 2023-03-31 ENCOUNTER — PRENATAL OFFICE VISIT (OUTPATIENT)
Dept: OBGYN | Facility: CLINIC | Age: 33
End: 2023-03-31
Payer: COMMERCIAL

## 2023-03-31 VITALS — WEIGHT: 198 LBS | SYSTOLIC BLOOD PRESSURE: 116 MMHG | BODY MASS INDEX: 31.96 KG/M2 | DIASTOLIC BLOOD PRESSURE: 60 MMHG

## 2023-03-31 DIAGNOSIS — N89.8 VAGINAL DISCHARGE: ICD-10-CM

## 2023-03-31 DIAGNOSIS — B37.9 YEAST INFECTION: ICD-10-CM

## 2023-03-31 DIAGNOSIS — Z34.82 SUPERVISION OF NORMAL INTRAUTERINE PREGNANCY IN MULTIGRAVIDA IN SECOND TRIMESTER: Primary | ICD-10-CM

## 2023-03-31 LAB
CLUE CELLS: ABNORMAL
TRICHOMONAS, WET PREP: ABNORMAL
WBC'S/HIGH POWER FIELD, WET PREP: ABNORMAL
YEAST, WET PREP: PRESENT

## 2023-03-31 PROCEDURE — 99207 PR PRENATAL VISIT: CPT | Performed by: OBSTETRICS & GYNECOLOGY

## 2023-03-31 PROCEDURE — 87106 FUNGI IDENTIFICATION YEAST: CPT | Performed by: OBSTETRICS & GYNECOLOGY

## 2023-03-31 PROCEDURE — 87102 FUNGUS ISOLATION CULTURE: CPT | Performed by: OBSTETRICS & GYNECOLOGY

## 2023-03-31 PROCEDURE — 87210 SMEAR WET MOUNT SALINE/INK: CPT | Performed by: OBSTETRICS & GYNECOLOGY

## 2023-03-31 RX ORDER — MICONAZOLE NITRATE 2 %
1 CREAM WITH APPLICATOR VAGINAL AT BEDTIME
Qty: 45 G | Refills: 0 | Status: SHIPPED | OUTPATIENT
Start: 2023-03-31 | End: 2023-04-07

## 2023-03-31 NOTE — PROGRESS NOTES
Doing well today.  +FM--very active  No ctx/cramping/vb/spotting  Has noticed more vaginal discharge --watery, yellowish, +odor at times; no itching/burning; tried baking soda bath but no other self treatments  No urinary symptoms; denies bowel issues  Wet prep today:  +yeast --will treat with topical miconazole  Had good trip to [place] 3wks --has repeat us for mild renal pylectasis at that time; will also do glucola, CBC and Tdap

## 2023-04-03 LAB — BACTERIA SPEC CULT: ABNORMAL

## 2023-05-01 ENCOUNTER — MYC MEDICAL ADVICE (OUTPATIENT)
Dept: OBGYN | Facility: CLINIC | Age: 33
End: 2023-05-01
Payer: COMMERCIAL

## 2023-05-02 ENCOUNTER — PRENATAL OFFICE VISIT (OUTPATIENT)
Dept: OBGYN | Facility: CLINIC | Age: 33
End: 2023-05-02
Attending: OBSTETRICS & GYNECOLOGY
Payer: COMMERCIAL

## 2023-05-02 ENCOUNTER — ANCILLARY PROCEDURE (OUTPATIENT)
Dept: ULTRASOUND IMAGING | Facility: CLINIC | Age: 33
End: 2023-05-02
Attending: OBSTETRICS & GYNECOLOGY
Payer: COMMERCIAL

## 2023-05-02 ENCOUNTER — LAB (OUTPATIENT)
Dept: LAB | Facility: CLINIC | Age: 33
End: 2023-05-02
Payer: COMMERCIAL

## 2023-05-02 VITALS — SYSTOLIC BLOOD PRESSURE: 122 MMHG | BODY MASS INDEX: 32.12 KG/M2 | DIASTOLIC BLOOD PRESSURE: 74 MMHG | WEIGHT: 199 LBS

## 2023-05-02 DIAGNOSIS — Z34.82 SUPERVISION OF NORMAL INTRAUTERINE PREGNANCY IN MULTIGRAVIDA IN SECOND TRIMESTER: ICD-10-CM

## 2023-05-02 DIAGNOSIS — Z34.82 SUPERVISION OF NORMAL INTRAUTERINE PREGNANCY IN MULTIGRAVIDA IN SECOND TRIMESTER: Primary | ICD-10-CM

## 2023-05-02 DIAGNOSIS — O35.EXX0 PYELECTASIS OF FETUS ON PRENATAL ULTRASOUND: ICD-10-CM

## 2023-05-02 DIAGNOSIS — Z3A.29 29 WEEKS GESTATION OF PREGNANCY: ICD-10-CM

## 2023-05-02 DIAGNOSIS — Z23 NEED FOR TDAP VACCINATION: ICD-10-CM

## 2023-05-02 PROBLEM — Z34.80 SUPERVISION OF NORMAL INTRAUTERINE PREGNANCY IN MULTIGRAVIDA: Status: ACTIVE | Noted: 2022-12-06

## 2023-05-02 LAB
ERYTHROCYTE [DISTWIDTH] IN BLOOD BY AUTOMATED COUNT: 12.9 % (ref 10–15)
GLUCOSE 1H P 50 G GLC PO SERPL-MCNC: 111 MG/DL (ref 70–129)
HCT VFR BLD AUTO: 32.9 % (ref 35–47)
HGB BLD-MCNC: 11 G/DL (ref 11.7–15.7)
MCH RBC QN AUTO: 30.3 PG (ref 26.5–33)
MCHC RBC AUTO-ENTMCNC: 33.4 G/DL (ref 31.5–36.5)
MCV RBC AUTO: 91 FL (ref 78–100)
PLATELET # BLD AUTO: 249 10E3/UL (ref 150–450)
RBC # BLD AUTO: 3.63 10E6/UL (ref 3.8–5.2)
WBC # BLD AUTO: 11.8 10E3/UL (ref 4–11)

## 2023-05-02 PROCEDURE — 76816 OB US FOLLOW-UP PER FETUS: CPT | Performed by: OBSTETRICS & GYNECOLOGY

## 2023-05-02 PROCEDURE — 36415 COLL VENOUS BLD VENIPUNCTURE: CPT

## 2023-05-02 PROCEDURE — 85027 COMPLETE CBC AUTOMATED: CPT

## 2023-05-02 PROCEDURE — 82950 GLUCOSE TEST: CPT

## 2023-05-02 PROCEDURE — 99207 PR PRENATAL VISIT: CPT | Performed by: OBSTETRICS & GYNECOLOGY

## 2023-05-02 PROCEDURE — 90471 IMMUNIZATION ADMIN: CPT | Performed by: OBSTETRICS & GYNECOLOGY

## 2023-05-02 PROCEDURE — 90715 TDAP VACCINE 7 YRS/> IM: CPT | Performed by: OBSTETRICS & GYNECOLOGY

## 2023-05-02 NOTE — PROGRESS NOTES
Doing well today.  +FM  Has URI for the last 2 weeks --starting to get better  Has had some left jaw pain this week --seeing her dentist this week  No fever/chills; no cough.  Slight congestion.  Negative COVID testing; family had similar symptoms and all recovered  US today due to right pylectasis at anatomy us --resolved; growth AGA, vtx, EFW 1488g 42%ile, MVP 6.6cm  Glucola, CBC and Tdpa today  RTC 2wks

## 2023-05-18 ENCOUNTER — PRENATAL OFFICE VISIT (OUTPATIENT)
Dept: OBGYN | Facility: CLINIC | Age: 33
End: 2023-05-18
Payer: COMMERCIAL

## 2023-05-18 VITALS — BODY MASS INDEX: 32.28 KG/M2 | SYSTOLIC BLOOD PRESSURE: 100 MMHG | WEIGHT: 200 LBS | DIASTOLIC BLOOD PRESSURE: 56 MMHG

## 2023-05-18 DIAGNOSIS — Z34.83 SUPERVISION OF NORMAL INTRAUTERINE PREGNANCY IN MULTIGRAVIDA IN THIRD TRIMESTER: Primary | ICD-10-CM

## 2023-05-18 DIAGNOSIS — Z3A.32 32 WEEKS GESTATION OF PREGNANCY: ICD-10-CM

## 2023-05-18 PROCEDURE — 99207 PR PRENATAL VISIT: CPT | Performed by: OBSTETRICS & GYNECOLOGY

## 2023-05-18 NOTE — PROGRESS NOTES
Doing well today.  +FM  No ctx/cramping/vb/spotting  No bowel/bladder issues  Overall feeling well  RTC 2wks

## 2023-05-30 ENCOUNTER — PRENATAL OFFICE VISIT (OUTPATIENT)
Dept: OBGYN | Facility: CLINIC | Age: 33
End: 2023-05-30
Payer: COMMERCIAL

## 2023-05-30 VITALS — BODY MASS INDEX: 32.86 KG/M2 | DIASTOLIC BLOOD PRESSURE: 54 MMHG | SYSTOLIC BLOOD PRESSURE: 92 MMHG | WEIGHT: 203.6 LBS

## 2023-05-30 DIAGNOSIS — Z34.83 SUPERVISION OF NORMAL INTRAUTERINE PREGNANCY IN MULTIGRAVIDA IN THIRD TRIMESTER: Primary | ICD-10-CM

## 2023-05-30 PROCEDURE — 99207 PR PRENATAL VISIT: CPT | Performed by: STUDENT IN AN ORGANIZED HEALTH CARE EDUCATION/TRAINING PROGRAM

## 2023-05-30 NOTE — PROGRESS NOTES
Prenatal Care (33w6d)      Poonam Urban is a 32 year old  at 33w6d by LMP c/w 10w2d US  presenting for routine prenatal care. She is doing well. Denies LOF, VB, ctx. +FM.      Objective- see flow sheet    Poonam Urban is a 32 year old  at 33w6d presenting for routine ob visit.       ICD-10-CM    1. Supervision of normal intrauterine pregnancy in multigravida in third trimester  Z34.83           - First tri labs wnl. [facility]atomy [facility] pyelectasis, resolved at follow-up. S/p COVID-19 vaccine, influenza, TDAP. Normal 3rd tri labs. GBS at 36w.  - Discussed routine precautions  - TWlbs. Pregravid BMI 29. Expect 15-25lbs.    Follow-up in two weeks for routine care.     Betsey Treviño MD, S  23

## 2023-06-09 ENCOUNTER — TELEPHONE (OUTPATIENT)
Dept: OBGYN | Facility: CLINIC | Age: 33
End: 2023-06-09

## 2023-06-09 ENCOUNTER — HOSPITAL ENCOUNTER (OUTPATIENT)
Facility: CLINIC | Age: 33
Discharge: HOME | End: 2023-06-09
Attending: STUDENT IN AN ORGANIZED HEALTH CARE EDUCATION/TRAINING PROGRAM | Admitting: STUDENT IN AN ORGANIZED HEALTH CARE EDUCATION/TRAINING PROGRAM
Payer: COMMERCIAL

## 2023-06-09 ENCOUNTER — NURSE TRIAGE (OUTPATIENT)
Dept: OBGYN | Facility: CLINIC | Age: 33
End: 2023-06-09

## 2023-06-09 VITALS — DIASTOLIC BLOOD PRESSURE: 72 MMHG | TEMPERATURE: 98.6 F | SYSTOLIC BLOOD PRESSURE: 127 MMHG | RESPIRATION RATE: 18 BRPM

## 2023-06-09 DIAGNOSIS — O23.599 BACTERIAL VAGINOSIS IN PREGNANCY: Primary | ICD-10-CM

## 2023-06-09 DIAGNOSIS — B96.89 BACTERIAL VAGINOSIS IN PREGNANCY: Primary | ICD-10-CM

## 2023-06-09 PROBLEM — Z36.89 ENCOUNTER FOR TRIAGE IN PREGNANT PATIENT: Status: ACTIVE | Noted: 2023-06-09

## 2023-06-09 LAB
ALBUMIN UR-MCNC: 10 MG/DL
APPEARANCE UR: CLEAR
BACTERIA #/AREA URNS HPF: ABNORMAL /HPF
BILIRUB UR QL STRIP: NEGATIVE
CLUE CELLS: PRESENT
COLOR UR AUTO: YELLOW
GLUCOSE UR STRIP-MCNC: NEGATIVE MG/DL
HGB UR QL STRIP: NEGATIVE
KETONES UR STRIP-MCNC: NEGATIVE MG/DL
LEUKOCYTE ESTERASE UR QL STRIP: ABNORMAL
MUCOUS THREADS #/AREA URNS LPF: PRESENT /LPF
NITRATE UR QL: NEGATIVE
PH UR STRIP: 6.5 [PH] (ref 5–7)
RBC URINE: 0 /HPF
SP GR UR STRIP: 1.02 (ref 1–1.03)
SQUAMOUS EPITHELIAL: 3 /HPF
TRICHOMONAS, WET PREP: ABNORMAL
UROBILINOGEN UR STRIP-MCNC: NORMAL MG/DL
WBC URINE: 1 /HPF
WBC'S/HIGH POWER FIELD, WET PREP: ABNORMAL
YEAST, WET PREP: ABNORMAL

## 2023-06-09 PROCEDURE — 87086 URINE CULTURE/COLONY COUNT: CPT | Performed by: STUDENT IN AN ORGANIZED HEALTH CARE EDUCATION/TRAINING PROGRAM

## 2023-06-09 PROCEDURE — 81001 URINALYSIS AUTO W/SCOPE: CPT | Performed by: STUDENT IN AN ORGANIZED HEALTH CARE EDUCATION/TRAINING PROGRAM

## 2023-06-09 PROCEDURE — G0463 HOSPITAL OUTPT CLINIC VISIT: HCPCS

## 2023-06-09 PROCEDURE — 87210 SMEAR WET MOUNT SALINE/INK: CPT | Performed by: STUDENT IN AN ORGANIZED HEALTH CARE EDUCATION/TRAINING PROGRAM

## 2023-06-09 RX ORDER — METOCLOPRAMIDE HYDROCHLORIDE 5 MG/ML
10 INJECTION INTRAMUSCULAR; INTRAVENOUS EVERY 6 HOURS PRN
Status: DISCONTINUED | OUTPATIENT
Start: 2023-06-09 | End: 2023-06-09 | Stop reason: HOSPADM

## 2023-06-09 RX ORDER — PROCHLORPERAZINE 25 MG
25 SUPPOSITORY, RECTAL RECTAL EVERY 12 HOURS PRN
Status: DISCONTINUED | OUTPATIENT
Start: 2023-06-09 | End: 2023-06-09 | Stop reason: HOSPADM

## 2023-06-09 RX ORDER — METOCLOPRAMIDE 10 MG/1
10 TABLET ORAL EVERY 6 HOURS PRN
Status: DISCONTINUED | OUTPATIENT
Start: 2023-06-09 | End: 2023-06-09 | Stop reason: HOSPADM

## 2023-06-09 RX ORDER — ONDANSETRON 4 MG/1
4 TABLET, ORALLY DISINTEGRATING ORAL EVERY 6 HOURS PRN
Status: DISCONTINUED | OUTPATIENT
Start: 2023-06-09 | End: 2023-06-09 | Stop reason: HOSPADM

## 2023-06-09 RX ORDER — ONDANSETRON 2 MG/ML
4 INJECTION INTRAMUSCULAR; INTRAVENOUS EVERY 6 HOURS PRN
Status: DISCONTINUED | OUTPATIENT
Start: 2023-06-09 | End: 2023-06-09 | Stop reason: HOSPADM

## 2023-06-09 RX ORDER — PROCHLORPERAZINE MALEATE 5 MG
10 TABLET ORAL EVERY 6 HOURS PRN
Status: DISCONTINUED | OUTPATIENT
Start: 2023-06-09 | End: 2023-06-09 | Stop reason: HOSPADM

## 2023-06-09 RX ORDER — METRONIDAZOLE 500 MG/1
500 TABLET ORAL 2 TIMES DAILY
Qty: 14 TABLET | Refills: 0 | Status: SHIPPED | OUTPATIENT
Start: 2023-06-09 | End: 2023-06-16

## 2023-06-09 ASSESSMENT — ACTIVITIES OF DAILY LIVING (ADL): ADLS_ACUITY_SCORE: 18

## 2023-06-09 NOTE — DISCHARGE INSTRUCTIONS
Discharge Instruction for Undelivered Patients      You were seen for: Labor Assessment  We Consulted: Dr Treviño   You had (Test or Medicine):Fetal and uterine monitoring, cervical exam, UA and wet prep     Diet:   Drink 8 to 12 glasses of liquids (milk, juice, water) every day.  You may eat meals and snacks.     Activity:  Count fetal kicks everyday (see handout)  Call your doctor or nurse midwife if your baby is moving less than usual.     Call your provider if you notice:  Swelling in your face or increased swelling in your hands or legs.  Headaches that are not relieved by Tylenol (acetaminophen).  Changes in your vision (blurring: seeing spots or stars.)  Nausea (sick to your stomach) and vomiting (throwing up).   Weight gain of 5 pounds or more per week.  Heartburn that doesn't go away.  Signs of bladder infection: pain when you urinate (use the toilet), need to go more often and more urgently.  The bag of simons (rupture of membranes) breaks, or you notice leaking in your underwear.  Bright red blood in your underwear.  Abdominal (lower belly) or stomach pain.  Second (plus) baby: Contractions (tightening) less than 10 minutes apart and getting stronger.  Increase or change in vaginal discharge (note the color and amount)    Follow-up:  As scheduled in the clinic

## 2023-06-09 NOTE — TELEPHONE ENCOUNTER
Patient calling to report lower back and lower stomach pain; 35 weeks pregnant. Triaged for pregnancy - abd pain greater than 20 weeks:    Location: midline, lower abdominal pain, lower back pain  Radiation: isolated to lower back and lower abdomen  Onset: yesterday evening (6/8/23)  Severity: 5/10  Recurrent symptom: denies, not with this pregnancy  Cause: unknown besides pregnancy  Relieving/aggravating factors: typically helps to sit down but it is no longer relieved by rest; patient reports diarrhea last night but symptoms still not relieved after BM  Fetal movement: patient reports fetal movement is normal  Other symptoms: none  NADIA: 7/12/23    Nurse Triage SBAR    Is this a 2nd Level Triage? YES, LICENSED PRACTITIONER REVIEW IS REQUIRED    Protocol Recommended Disposition:   Go To LD Now    Recommendation:   FYI to PCP that patient called and was triaged for symptoms. Warm consult to Dr. Middleton' office was provided, after which patient was routed to a triage nurse at Dr. Middleton' office.    Routed to provider    Does the patient meet one of the following criteria for ADS visit consideration? 16+ years old, with an [facility] PCP     TIP  Providers, please consider if this condition is appropriate for management at one of our Acute and Diagnostic Services sites.     If patient is a good candidate, please use dotphrase <dot>triageresponse and select Refer to ADS to document.    Dimple Collins RN  [facility]      Reason for Disposition    Leakage of fluid from vagina    Additional Information    Negative: Passed out (i.e., fainted, collapsed and was not responding)    Negative: Shock suspected (e.g., cold/pale/clammy skin, too weak to stand, low BP, rapid pulse)    Negative: Difficult to awaken or acting confused (e.g., disoriented, slurred speech)    Negative: SEVERE abdominal pain (e.g., excruciating), constant, and present > 1 hour    Negative: SEVERE vaginal bleeding (e.g., continuous red  blood from vagina, large blood clots)    Negative: Sounds like a life-threatening emergency to the triager    Negative: Having contractions or other symptoms of labor (such as vaginal pressure) and pregnant 37 or more weeks (i.e., term pregnancy)    Negative: Having contractions or other symptoms of labor (such as vaginal pressure) and < 37 weeks pregnant (i.e., )    Negative: Abdominal pain and pregnant < 20 weeks    Negative: Vomiting red blood or black (coffee ground) material    Negative: SEVERE headache that is not relieved with acetaminophen (e.g., Tylenol)    Negative: MODERATE-SEVERE abdominal pain (e.g., interferes with normal activities, awakens from sleep)    Negative: Vaginal bleeding or spotting    Negative: Baby moving less today (e.g., kick count < 5 in 1 hour or < 10 in 2 hours) and 23 or more weeks pregnant    Protocols used: PREGNANCY - ABDOMINAL PAIN GREATER THAN 20 WEEKS EGA-A-OH

## 2023-06-09 NOTE — PLAN OF CARE
"Poonam presents ambulatory and unaccompanied for evaluation of abdominal pain and low back pain. Pt to room 3, oriented to room and call light. EFM/toco applied with pts consent. Fht's present.     Pt is  35w2d of Dr. Parrish. Dr Treviño is on call. She states that she has had persistent lower abdominal and low back discomfort since yesterday. She describes it as cramping and pressure and rates it a 4/10. She denies VB/LOF, dysuria, headache, visual changes,  epigastric pain or edema. She says that rest usual helps her feel better and that it has not helped today. She states that she is \"mildly nauseas\" but no vomiting. She had a bowel movement of loose stool this afternoon and denies constipation.     Upon exam, she has thick white discharge and some vaginal redness of the inner labia. She denies vaginal pain or itching. SVE is closed and high. Wet prep obtained prior to SVE.     Fht's are 145 with moderate variability, accels present and no decels noted. Presumed adequate fetal oxygenation. UC's every 1.5-4 minutes apart that palpate mild to writer and the pt describes as \"cramping/tightening\".     Dr. Treviño notified of the pts arrival and above data. Orders received for wet prep, UAUC,  continuous fetal and uterine monitoring for 1 hour and recheck SVE.     Plan discussed with the pt and she verbalizes understanding and agreement.         "

## 2023-06-09 NOTE — PLAN OF CARE
Dr. Treviño updated regarding unchanged SVE, UC's every 4-5 minutes, and lab results reviewed with provider. MD to send rx for Metronidazole. Pt will  on her way home.     Monitors removed Fht's 145 with moderate variability, accels present and no decels. Discharge instructions reviewed with Poonam and questions answered. This RN emphasized the importance of taking her first dose of medication this evening and to call clinic if contractions get stronger. She is to otherwise follow up as scheduled in the clinic. Poonam verbalizes understanding and agreement. Pt d/c'd home ambulatory.

## 2023-06-11 LAB — BACTERIA UR CULT: NORMAL

## 2023-06-13 ENCOUNTER — PRENATAL OFFICE VISIT (OUTPATIENT)
Dept: OBGYN | Facility: CLINIC | Age: 33
End: 2023-06-13
Payer: COMMERCIAL

## 2023-06-13 VITALS — BODY MASS INDEX: 33.25 KG/M2 | WEIGHT: 206 LBS | DIASTOLIC BLOOD PRESSURE: 70 MMHG | SYSTOLIC BLOOD PRESSURE: 114 MMHG

## 2023-06-13 DIAGNOSIS — Z3A.35 35 WEEKS GESTATION OF PREGNANCY: ICD-10-CM

## 2023-06-13 DIAGNOSIS — Z34.83 SUPERVISION OF NORMAL INTRAUTERINE PREGNANCY IN MULTIGRAVIDA IN THIRD TRIMESTER: Primary | ICD-10-CM

## 2023-06-13 PROCEDURE — 99207 PR PRENATAL VISIT: CPT | Performed by: OBSTETRICS & GYNECOLOGY

## 2023-06-13 PROCEDURE — 87653 STREP B DNA AMP PROBE: CPT | Performed by: OBSTETRICS & GYNECOLOGY

## 2023-06-13 NOTE — PROGRESS NOTES
Doing well today.  +FM  Seen in [facility] over the weekend for cramping and generally not feeling well; treated for BV with flagyl  Still having intermittent crampign and occ ctx; no vb/spotting/lof  No bowel/bladder issues today  --had some diarrhea over the weekend  GBS collected  Labor precautions reviewed  Breast pump rx given  RTC 1wk

## 2023-06-14 LAB — GP B STREP DNA SPEC QL NAA+PROBE: NEGATIVE

## 2023-06-22 ENCOUNTER — PRENATAL OFFICE VISIT (OUTPATIENT)
Dept: OBGYN | Facility: CLINIC | Age: 33
End: 2023-06-22
Payer: COMMERCIAL

## 2023-06-22 VITALS — SYSTOLIC BLOOD PRESSURE: 112 MMHG | DIASTOLIC BLOOD PRESSURE: 74 MMHG | WEIGHT: 204 LBS | BODY MASS INDEX: 32.93 KG/M2

## 2023-06-22 DIAGNOSIS — Z3A.37 37 WEEKS GESTATION OF PREGNANCY: ICD-10-CM

## 2023-06-22 DIAGNOSIS — Z34.83 SUPERVISION OF NORMAL INTRAUTERINE PREGNANCY IN MULTIGRAVIDA IN THIRD TRIMESTER: Primary | ICD-10-CM

## 2023-06-22 PROCEDURE — 99207 PR PRENATAL VISIT: CPT | Performed by: OBSTETRICS & GYNECOLOGY

## 2023-06-22 NOTE — PROGRESS NOTES
Doing well today.  +FM  No ctx/cramping/vb/spotting  Had GI bug over the weekend --+nausea/vomiting and diarrhea --bowels now back to normal  No urinary issues  GBS negative  Labor precautions reviewed  RTC 1wk --aware of my absence

## 2023-06-29 ENCOUNTER — ANESTHESIA (OUTPATIENT)
Dept: OBGYN | Facility: CLINIC | Age: 33
End: 2023-06-29
Payer: COMMERCIAL

## 2023-06-29 ENCOUNTER — HOSPITAL ENCOUNTER (INPATIENT)
Facility: CLINIC | Age: 33
LOS: 2 days | Discharge: HOME | End: 2023-07-01
Attending: STUDENT IN AN ORGANIZED HEALTH CARE EDUCATION/TRAINING PROGRAM | Admitting: STUDENT IN AN ORGANIZED HEALTH CARE EDUCATION/TRAINING PROGRAM
Payer: COMMERCIAL

## 2023-06-29 ENCOUNTER — PRENATAL OFFICE VISIT (OUTPATIENT)
Dept: OBGYN | Facility: CLINIC | Age: 33
End: 2023-06-29
Payer: COMMERCIAL

## 2023-06-29 ENCOUNTER — ANESTHESIA EVENT (OUTPATIENT)
Dept: OBGYN | Facility: CLINIC | Age: 33
End: 2023-06-29
Payer: COMMERCIAL

## 2023-06-29 VITALS — SYSTOLIC BLOOD PRESSURE: 122 MMHG | DIASTOLIC BLOOD PRESSURE: 74 MMHG | BODY MASS INDEX: 32.77 KG/M2 | WEIGHT: 203 LBS

## 2023-06-29 DIAGNOSIS — O35.9XX0 FETAL ABNORMALITY AFFECTING MANAGEMENT OF MOTHER, SINGLE OR UNSPECIFIED FETUS: Primary | ICD-10-CM

## 2023-06-29 DIAGNOSIS — Z34.83 SUPERVISION OF NORMAL INTRAUTERINE PREGNANCY IN MULTIGRAVIDA IN THIRD TRIMESTER: Primary | ICD-10-CM

## 2023-06-29 LAB
ABO/RH(D): NORMAL
ALBUMIN SERPL BCG-MCNC: 3.5 G/DL (ref 3.5–5.2)
ALP SERPL-CCNC: 187 U/L (ref 35–104)
ALT SERPL W P-5'-P-CCNC: 15 U/L (ref 0–50)
ANION GAP SERPL CALCULATED.3IONS-SCNC: 15 MMOL/L (ref 7–15)
ANTIBODY SCREEN: NEGATIVE
AST SERPL W P-5'-P-CCNC: 20 U/L (ref 0–45)
BILIRUB SERPL-MCNC: 0.2 MG/DL
BUN SERPL-MCNC: 5.5 MG/DL (ref 6–20)
CALCIUM SERPL-MCNC: 9 MG/DL (ref 8.6–10)
CHLORIDE SERPL-SCNC: 104 MMOL/L (ref 98–107)
CREAT SERPL-MCNC: 0.63 MG/DL (ref 0.51–0.95)
DEPRECATED HCO3 PLAS-SCNC: 19 MMOL/L (ref 22–29)
GFR SERPL CREATININE-BSD FRML MDRD: >90 ML/MIN/1.73M2
GLUCOSE SERPL-MCNC: 85 MG/DL (ref 70–99)
MAGNESIUM SERPL-MCNC: 2 MG/DL (ref 1.7–2.3)
PHOSPHATE SERPL-MCNC: 2.9 MG/DL (ref 2.5–4.5)
POTASSIUM SERPL-SCNC: 3.8 MMOL/L (ref 3.4–5.3)
PROT SERPL-MCNC: 6.9 G/DL (ref 6.4–8.3)
SODIUM SERPL-SCNC: 138 MMOL/L (ref 136–145)
SPECIMEN EXPIRATION DATE: NORMAL
TSH SERPL DL<=0.005 MIU/L-ACNC: 1.48 UIU/ML (ref 0.3–4.2)

## 2023-06-29 PROCEDURE — 258N000003 HC RX IP 258 OP 636: Performed by: STUDENT IN AN ORGANIZED HEALTH CARE EDUCATION/TRAINING PROGRAM

## 2023-06-29 PROCEDURE — 250N000011 HC RX IP 250 OP 636: Performed by: ANESTHESIOLOGY

## 2023-06-29 PROCEDURE — 370N000003 HC ANESTHESIA WARD SERVICE: Performed by: ANESTHESIOLOGY

## 2023-06-29 PROCEDURE — 99207 PR PRENATAL VISIT: CPT | Performed by: STUDENT IN AN ORGANIZED HEALTH CARE EDUCATION/TRAINING PROGRAM

## 2023-06-29 PROCEDURE — 250N000011 HC RX IP 250 OP 636: Mod: JZ | Performed by: STUDENT IN AN ORGANIZED HEALTH CARE EDUCATION/TRAINING PROGRAM

## 2023-06-29 PROCEDURE — 120N000001 HC R&B MED SURG/OB

## 2023-06-29 PROCEDURE — 83735 ASSAY OF MAGNESIUM: CPT | Performed by: STUDENT IN AN ORGANIZED HEALTH CARE EDUCATION/TRAINING PROGRAM

## 2023-06-29 PROCEDURE — 80053 COMPREHEN METABOLIC PANEL: CPT | Performed by: STUDENT IN AN ORGANIZED HEALTH CARE EDUCATION/TRAINING PROGRAM

## 2023-06-29 PROCEDURE — 250N000009 HC RX 250: Performed by: STUDENT IN AN ORGANIZED HEALTH CARE EDUCATION/TRAINING PROGRAM

## 2023-06-29 PROCEDURE — 10907ZC DRAINAGE OF AMNIOTIC FLUID, THERAPEUTIC FROM PRODUCTS OF CONCEPTION, VIA NATURAL OR ARTIFICIAL OPENING: ICD-10-PCS | Performed by: SPECIALIST

## 2023-06-29 PROCEDURE — 84100 ASSAY OF PHOSPHORUS: CPT | Performed by: STUDENT IN AN ORGANIZED HEALTH CARE EDUCATION/TRAINING PROGRAM

## 2023-06-29 PROCEDURE — 84443 ASSAY THYROID STIM HORMONE: CPT | Performed by: STUDENT IN AN ORGANIZED HEALTH CARE EDUCATION/TRAINING PROGRAM

## 2023-06-29 PROCEDURE — 36415 COLL VENOUS BLD VENIPUNCTURE: CPT | Performed by: STUDENT IN AN ORGANIZED HEALTH CARE EDUCATION/TRAINING PROGRAM

## 2023-06-29 PROCEDURE — 86901 BLOOD TYPING SEROLOGIC RH(D): CPT | Performed by: STUDENT IN AN ORGANIZED HEALTH CARE EDUCATION/TRAINING PROGRAM

## 2023-06-29 PROCEDURE — 86850 RBC ANTIBODY SCREEN: CPT | Performed by: STUDENT IN AN ORGANIZED HEALTH CARE EDUCATION/TRAINING PROGRAM

## 2023-06-29 RX ORDER — CARBOPROST TROMETHAMINE 250 UG/ML
250 INJECTION, SOLUTION INTRAMUSCULAR
Status: DISCONTINUED | OUTPATIENT
Start: 2023-06-29 | End: 2023-06-30 | Stop reason: HOSPADM

## 2023-06-29 RX ORDER — ROPIVACAINE HYDROCHLORIDE 2 MG/ML
INJECTION, SOLUTION EPIDURAL; INFILTRATION; PERINEURAL
Status: COMPLETED | OUTPATIENT
Start: 2023-06-29 | End: 2023-06-30

## 2023-06-29 RX ORDER — PROCHLORPERAZINE 25 MG
25 SUPPOSITORY, RECTAL RECTAL EVERY 12 HOURS PRN
Status: DISCONTINUED | OUTPATIENT
Start: 2023-06-29 | End: 2023-06-30 | Stop reason: HOSPADM

## 2023-06-29 RX ORDER — CITRIC ACID/SODIUM CITRATE 334-500MG
30 SOLUTION, ORAL ORAL
Status: DISCONTINUED | OUTPATIENT
Start: 2023-06-29 | End: 2023-06-30 | Stop reason: HOSPADM

## 2023-06-29 RX ORDER — PROCHLORPERAZINE 25 MG
25 SUPPOSITORY, RECTAL RECTAL EVERY 12 HOURS PRN
Status: DISCONTINUED | OUTPATIENT
Start: 2023-06-29 | End: 2023-06-29 | Stop reason: HOSPADM

## 2023-06-29 RX ORDER — SODIUM CHLORIDE, SODIUM LACTATE, POTASSIUM CHLORIDE, CALCIUM CHLORIDE 600; 310; 30; 20 MG/100ML; MG/100ML; MG/100ML; MG/100ML
INJECTION, SOLUTION INTRAVENOUS CONTINUOUS
Status: DISCONTINUED | OUTPATIENT
Start: 2023-06-29 | End: 2023-06-30 | Stop reason: HOSPADM

## 2023-06-29 RX ORDER — MISOPROSTOL 200 UG/1
400 TABLET ORAL
Status: DISCONTINUED | OUTPATIENT
Start: 2023-06-29 | End: 2023-06-30 | Stop reason: HOSPADM

## 2023-06-29 RX ORDER — PROCHLORPERAZINE MALEATE 5 MG
10 TABLET ORAL EVERY 6 HOURS PRN
Status: DISCONTINUED | OUTPATIENT
Start: 2023-06-29 | End: 2023-06-30 | Stop reason: HOSPADM

## 2023-06-29 RX ORDER — METHYLERGONOVINE MALEATE 0.2 MG/ML
200 INJECTION INTRAVENOUS
Status: DISCONTINUED | OUTPATIENT
Start: 2023-06-29 | End: 2023-06-30 | Stop reason: HOSPADM

## 2023-06-29 RX ORDER — KETOROLAC TROMETHAMINE 30 MG/ML
30 INJECTION, SOLUTION INTRAMUSCULAR; INTRAVENOUS
Status: DISCONTINUED | OUTPATIENT
Start: 2023-06-29 | End: 2023-06-30

## 2023-06-29 RX ORDER — ONDANSETRON 4 MG/1
4 TABLET, ORALLY DISINTEGRATING ORAL EVERY 6 HOURS PRN
Status: DISCONTINUED | OUTPATIENT
Start: 2023-06-29 | End: 2023-06-30 | Stop reason: HOSPADM

## 2023-06-29 RX ORDER — PROCHLORPERAZINE MALEATE 5 MG
10 TABLET ORAL EVERY 6 HOURS PRN
Status: DISCONTINUED | OUTPATIENT
Start: 2023-06-29 | End: 2023-06-29 | Stop reason: HOSPADM

## 2023-06-29 RX ORDER — METOCLOPRAMIDE HYDROCHLORIDE 5 MG/ML
10 INJECTION INTRAMUSCULAR; INTRAVENOUS EVERY 6 HOURS PRN
Status: DISCONTINUED | OUTPATIENT
Start: 2023-06-29 | End: 2023-06-30 | Stop reason: HOSPADM

## 2023-06-29 RX ORDER — SODIUM CHLORIDE, SODIUM LACTATE, POTASSIUM CHLORIDE, CALCIUM CHLORIDE 600; 310; 30; 20 MG/100ML; MG/100ML; MG/100ML; MG/100ML
INJECTION, SOLUTION INTRAVENOUS CONTINUOUS PRN
Status: DISCONTINUED | OUTPATIENT
Start: 2023-06-29 | End: 2023-06-30 | Stop reason: HOSPADM

## 2023-06-29 RX ORDER — METOCLOPRAMIDE HYDROCHLORIDE 5 MG/ML
10 INJECTION INTRAMUSCULAR; INTRAVENOUS EVERY 6 HOURS PRN
Status: DISCONTINUED | OUTPATIENT
Start: 2023-06-29 | End: 2023-06-29 | Stop reason: HOSPADM

## 2023-06-29 RX ORDER — CITRIC ACID/SODIUM CITRATE 334-500MG
30 SOLUTION, ORAL ORAL ONCE
Status: COMPLETED | OUTPATIENT
Start: 2023-06-29 | End: 2023-06-29

## 2023-06-29 RX ORDER — OXYTOCIN/0.9 % SODIUM CHLORIDE 30/500 ML
100-340 PLASTIC BAG, INJECTION (ML) INTRAVENOUS CONTINUOUS PRN
Status: DISCONTINUED | OUTPATIENT
Start: 2023-06-29 | End: 2023-06-30

## 2023-06-29 RX ORDER — LIDOCAINE 40 MG/G
CREAM TOPICAL
Status: DISCONTINUED | OUTPATIENT
Start: 2023-06-29 | End: 2023-06-30 | Stop reason: HOSPADM

## 2023-06-29 RX ORDER — NALBUPHINE HYDROCHLORIDE 20 MG/ML
2.5-5 INJECTION, SOLUTION INTRAMUSCULAR; INTRAVENOUS; SUBCUTANEOUS EVERY 6 HOURS PRN
Status: DISCONTINUED | OUTPATIENT
Start: 2023-06-29 | End: 2023-06-30

## 2023-06-29 RX ORDER — TERBUTALINE SULFATE 1 MG/ML
0.25 INJECTION, SOLUTION SUBCUTANEOUS
Status: DISCONTINUED | OUTPATIENT
Start: 2023-06-29 | End: 2023-06-30 | Stop reason: HOSPADM

## 2023-06-29 RX ORDER — ONDANSETRON 2 MG/ML
4 INJECTION INTRAMUSCULAR; INTRAVENOUS EVERY 6 HOURS PRN
Status: DISCONTINUED | OUTPATIENT
Start: 2023-06-29 | End: 2023-06-30 | Stop reason: HOSPADM

## 2023-06-29 RX ORDER — IBUPROFEN 400 MG/1
800 TABLET, FILM COATED ORAL
Status: DISCONTINUED | OUTPATIENT
Start: 2023-06-29 | End: 2023-06-30

## 2023-06-29 RX ORDER — NALOXONE HYDROCHLORIDE 0.4 MG/ML
0.4 INJECTION, SOLUTION INTRAMUSCULAR; INTRAVENOUS; SUBCUTANEOUS
Status: DISCONTINUED | OUTPATIENT
Start: 2023-06-29 | End: 2023-06-30 | Stop reason: HOSPADM

## 2023-06-29 RX ORDER — METOCLOPRAMIDE 10 MG/1
10 TABLET ORAL EVERY 6 HOURS PRN
Status: DISCONTINUED | OUTPATIENT
Start: 2023-06-29 | End: 2023-06-30 | Stop reason: HOSPADM

## 2023-06-29 RX ORDER — FENTANYL CITRATE 50 UG/ML
100 INJECTION, SOLUTION INTRAMUSCULAR; INTRAVENOUS ONCE
Status: COMPLETED | OUTPATIENT
Start: 2023-06-30 | End: 2023-06-30

## 2023-06-29 RX ORDER — MISOPROSTOL 200 UG/1
800 TABLET ORAL
Status: DISCONTINUED | OUTPATIENT
Start: 2023-06-29 | End: 2023-06-30 | Stop reason: HOSPADM

## 2023-06-29 RX ORDER — OXYTOCIN/0.9 % SODIUM CHLORIDE 30/500 ML
1-24 PLASTIC BAG, INJECTION (ML) INTRAVENOUS CONTINUOUS
Status: DISCONTINUED | OUTPATIENT
Start: 2023-06-29 | End: 2023-06-30 | Stop reason: HOSPADM

## 2023-06-29 RX ORDER — ROPIVACAINE HYDROCHLORIDE 2 MG/ML
10 INJECTION, SOLUTION EPIDURAL; INFILTRATION; PERINEURAL ONCE
Status: DISCONTINUED | OUTPATIENT
Start: 2023-06-30 | End: 2023-06-30 | Stop reason: HOSPADM

## 2023-06-29 RX ORDER — ROPIVACAINE HYDROCHLORIDE 2 MG/ML
10 INJECTION, SOLUTION EPIDURAL; INFILTRATION; PERINEURAL ONCE
Status: DISCONTINUED | OUTPATIENT
Start: 2023-06-29 | End: 2023-06-30 | Stop reason: HOSPADM

## 2023-06-29 RX ORDER — FENTANYL CITRATE 50 UG/ML
50 INJECTION, SOLUTION INTRAMUSCULAR; INTRAVENOUS EVERY 30 MIN PRN
Status: DISCONTINUED | OUTPATIENT
Start: 2023-06-29 | End: 2023-06-30 | Stop reason: HOSPADM

## 2023-06-29 RX ORDER — NALOXONE HYDROCHLORIDE 0.4 MG/ML
0.2 INJECTION, SOLUTION INTRAMUSCULAR; INTRAVENOUS; SUBCUTANEOUS
Status: DISCONTINUED | OUTPATIENT
Start: 2023-06-29 | End: 2023-06-30 | Stop reason: HOSPADM

## 2023-06-29 RX ORDER — TRANEXAMIC ACID 10 MG/ML
1 INJECTION, SOLUTION INTRAVENOUS EVERY 30 MIN PRN
Status: DISCONTINUED | OUTPATIENT
Start: 2023-06-29 | End: 2023-06-30 | Stop reason: HOSPADM

## 2023-06-29 RX ORDER — METOCLOPRAMIDE 10 MG/1
10 TABLET ORAL EVERY 6 HOURS PRN
Status: DISCONTINUED | OUTPATIENT
Start: 2023-06-29 | End: 2023-06-29 | Stop reason: HOSPADM

## 2023-06-29 RX ORDER — OXYTOCIN 10 [USP'U]/ML
10 INJECTION, SOLUTION INTRAMUSCULAR; INTRAVENOUS
Status: DISCONTINUED | OUTPATIENT
Start: 2023-06-29 | End: 2023-06-30

## 2023-06-29 RX ORDER — EPHEDRINE SULFATE 50 MG/ML
5 INJECTION, SOLUTION INTRAMUSCULAR; INTRAVENOUS; SUBCUTANEOUS
Status: DISCONTINUED | OUTPATIENT
Start: 2023-06-29 | End: 2023-06-30 | Stop reason: HOSPADM

## 2023-06-29 RX ORDER — ONDANSETRON 2 MG/ML
4 INJECTION INTRAMUSCULAR; INTRAVENOUS EVERY 6 HOURS PRN
Status: DISCONTINUED | OUTPATIENT
Start: 2023-06-29 | End: 2023-06-29 | Stop reason: HOSPADM

## 2023-06-29 RX ORDER — OXYTOCIN/0.9 % SODIUM CHLORIDE 30/500 ML
340 PLASTIC BAG, INJECTION (ML) INTRAVENOUS CONTINUOUS PRN
Status: DISCONTINUED | OUTPATIENT
Start: 2023-06-29 | End: 2023-06-30 | Stop reason: HOSPADM

## 2023-06-29 RX ORDER — OXYTOCIN 10 [USP'U]/ML
10 INJECTION, SOLUTION INTRAMUSCULAR; INTRAVENOUS
Status: DISCONTINUED | OUTPATIENT
Start: 2023-06-29 | End: 2023-06-30 | Stop reason: HOSPADM

## 2023-06-29 RX ORDER — ONDANSETRON 4 MG/1
4 TABLET, ORALLY DISINTEGRATING ORAL EVERY 6 HOURS PRN
Status: DISCONTINUED | OUTPATIENT
Start: 2023-06-29 | End: 2023-06-29 | Stop reason: HOSPADM

## 2023-06-29 RX ADMIN — ONDANSETRON 4 MG: 2 INJECTION INTRAMUSCULAR; INTRAVENOUS at 23:05

## 2023-06-29 RX ADMIN — Medication: at 22:07

## 2023-06-29 RX ADMIN — SODIUM CHLORIDE, POTASSIUM CHLORIDE, SODIUM LACTATE AND CALCIUM CHLORIDE 1000 ML: 600; 310; 30; 20 INJECTION, SOLUTION INTRAVENOUS at 21:04

## 2023-06-29 RX ADMIN — ROPIVACAINE HYDROCHLORIDE 10 ML: 2 INJECTION, SOLUTION EPIDURAL; INFILTRATION at 22:05

## 2023-06-29 RX ADMIN — SODIUM CHLORIDE, POTASSIUM CHLORIDE, SODIUM LACTATE AND CALCIUM CHLORIDE: 600; 310; 30; 20 INJECTION, SOLUTION INTRAVENOUS at 17:50

## 2023-06-29 RX ADMIN — Medication 2 MILLI-UNITS/MIN: at 17:58

## 2023-06-29 ASSESSMENT — ACTIVITIES OF DAILY LIVING (ADL)
ADLS_ACUITY_SCORE: 31
DIFFICULTY_EATING/SWALLOWING: NO
WEAR_GLASSES_OR_BLIND: NO
CONCENTRATING,_REMEMBERING_OR_MAKING_DECISIONS_DIFFICULTY: NO
TOILETING_ISSUES: NO
FALL_HISTORY_WITHIN_LAST_SIX_MONTHS: NO
ADLS_ACUITY_SCORE: 31
ADLS_ACUITY_SCORE: 31
DRESSING/BATHING_DIFFICULTY: NO
ADLS_ACUITY_SCORE: 31
DOING_ERRANDS_INDEPENDENTLY_DIFFICULTY: NO
WALKING_OR_CLIMBING_STAIRS_DIFFICULTY: NO
CHANGE_IN_FUNCTIONAL_STATUS_SINCE_ONSET_OF_CURRENT_ILLNESS/INJURY: NO

## 2023-06-29 NOTE — PROGRESS NOTES
Data: Patient presented to [facility]: 2023  3:52 PM.  Reason for maternal/fetal assessment is audible irregular fetal heartbeat in the clinic. Sent here for prolonged monitoring.. Patient reports feeling normal, no changes. Patient denies uterine contractions, leaking of vaginal fluid/rupture of membranes, vaginal bleeding, abdominal pain, pelvic pressure, nausea, vomiting, headache, visual disturbances, epigastric or RUQ pain. Patient reports fetal movement is normal. Patient is a 38w1d .  Prenatal record reviewed. Pregnancy has been uncomplicated.    Vital signs wnl. Support person is not present.     Action: Verbal consent for EFM. Triage assessment completed.     Response: Patient verbalized agreement with plan. Will contact Dr. Treviño with update and further orders.    1620: Spoke with Dr. Treviño on the phone regarding patient arrival and fetal strip so far. Dr. Treviño asked for this RN to listed audibly to fetal heart tones for abnormalities.    1635: after listening to audible abnormalities in the fetal heart tones, Dr. Treviño was paged. MD will come to assess in person. Telephone orders were received for labs. All needs met at this time.

## 2023-06-29 NOTE — PROGRESS NOTES
1723: patient to be admitted to labor and delivery for induction of labor per Dr. Treviño's orders.     1729: patient walked by foot to labor and delivery room 220. Patient was oriented to the room and call light. This RN remains assigned to the patient. With patient consent monitors were applied and IV was placed for pitocin placement.     All needs met at this time.

## 2023-06-29 NOTE — H&P
[facility]  OB History and Physical      Poonam Urban MRN# [id number]   Age: 32 year old YOB: 1990     CC:  Fetal arrhythmia     HPI:  Ms. Poonam Urban is a 32 year old  at 38w1d, who presents to triage due to abnormal FHR heard on bedside doptone in clinic. Denies any changes to perceived fetal movement. No changes to meds. She denies symptoms of palpitations, chest pain. She denies contractions, vaginal bleeding, and loss of fluid.         Prenatal Labs:   Lab Results   Component Value Date    ABO O 2021    RH Pos 2021    AS Negative 12/15/2022    HEPBANG Nonreactive 12/15/2022    CHPCRT  2013     Negative   Negative for C. trachomatis rRNA by transcription mediated amplification.   A negative result by transcription mediated amplification does not preclude the   presence of C. trachomatis infection because results are dependent on proper   and adequate collection, absence of inhibitors, and sufficient rRNA to be   detected.    GCPCRT  2013     Negative   Negative for N. gonorrhoeae rRNA by transcription mediated amplification.   A negative result by transcription mediated amplification does not preclude the   presence of N. gonorrhoeae infection because results are dependent on proper   and adequate collection, absence of inhibitors, and sufficient rRNA to be   detected.    HGB 11.0 (L) 2023       GBS Status: negative      OB History  OB History    Para Term  AB Living   2 1 1 0 0 1   SAB IAB Ectopic Multiple Live Births   0 0 0 0 1      # Outcome Date GA Lbr Kerwin/2nd Weight Sex Delivery Anes PTL Lv   2 Current            1 Term 21 38w4d 02:00 / 00:57 3.23 kg (7 lb 1.9 oz) F Vag-Spont EPI N DIANA      Name: Russel      Apgar1: 9  Apgar5: 9       PMHx:   Past Medical History:   Diagnosis Date     COVID-19 2022     Menarche 13 years old     PSHx:   Past Surgical History:   Procedure Laterality Date     DENTAL SURGERY       "[place] Teeth     EYE SURGERY       LASIK BILATERAL       Meds:   Medications Prior to Admission   Medication Sig Dispense Refill Last Dose     Prenatal Vit-Fe Fumarate-FA (PRENATAL MULTIVITAMIN W/IRON) 27-0.8 MG tablet Take 1 tablet by mouth daily        Allergies:    Allergies   Allergen Reactions     Dairy Products [Milk Protein]       FmHx:   Family History   Problem Relation Age of Onset     No Known Problems Mother      No Known Problems Father      No Known Problems Sister      No Known Problems Brother      No Known Problems Maternal Grandmother      No Known Problems Maternal Grandfather      No Known Problems Paternal Grandmother      Other Cancer Paternal Grandfather      No Known Problems Other      Cancer No family hx of         skin cancer       PE:  Vit:   Patient Vitals for the past 4 hrs:   BP Temp Temp src Resp Height Weight   23 1610 -- -- -- -- 1.676 m (5' 6\") 92.1 kg (203 lb)   23 1600 128/67 97.8  F (36.6  C) Temporal 18 -- --      Gen: Well-appearing, NAD, comfortable   CV: Regular rate  Pulm: Breathing comfortably on RA  Abd: Soft, gravid, non-tender  Ext: trace LE edema b/l  Cx: 3/70/-2 (in clinic)               FHT: Baseline 140bpm, moderate variability, + accelerations, on decelerations . Audible drop in heart rate after two normal beats, occurring sporadically for various durations, and then verts back to audibly normal rhythm.   [place]: irregular contractions in 10 minutes        Assessment  Ms. Poonam Urban is a 32 year old , at 38w1d who presents with fetal arrhythmia of unknown significance. I discussed recommendation to proceed with delivery.     Plan    - Fetal arrhythmia    -Will obtain CMP, magnesium TSH   - Discussed with MFM and FSH NNP. Likely PACs that will require post netta assessment. Immediately delivery is not necessarily indicated. Reviewed with patient this discussion. She elects to proceed with delivery today    - Otherwise, visually normal FHR " tracing, category I, reactive.    - Continuous EFM     - Labor   - Admit to L&D for IOL    - PNC:     - Rh positive. Rubella immune. GBS negative. No intervention indicated.    - Fen/GI: Clear liquid diet, IVF   - SVE: 3/70/-2   - Membranes: Intact   - Plan: Pitocin>AROM when amenable.    - Pain management: Per patient preference     Betsey Treviño MD, MHS  OB/GYN  6/29/2023

## 2023-06-29 NOTE — PROGRESS NOTES
Prenatal Care (38wks 1d)    Poonam Urban is a 32 year old  at 38w1d by LMP c/w 10w2d US  presenting for routine prenatal care. She is doing well. Denies LOF, VB, ctx. +FM.    Objective- see flow sheet  Cvx 3/60/-2, soft, mid position     Poonam Urban is a 32 year old  at 38w1d presenting for routine ob visit.       ICD-10-CM    1. Supervision of normal intrauterine pregnancy in multigravida in third trimester  Z34.83         - Irregular heart beat on doptone. Regular rate- 140s-150s. Two beats followed by dropped beat. To L&D for NST.     - First tri labs wnl. Anatomy US pyelectasis, resolved at follow-up. S/p COVID-19 vaccine, influenza, TDAP. Normal 3rd tri labs. GBS neg.   - Discussed routine precautions. [place] 8. Msg sent to Dr. Middleton for possible IOL after 39w, otherwise instructed to follow-up as scheduled.   - TWlbs. Pregravid BMI 29. Expect 15-25lbs.    Follow-up in one week for routine care.     Betsey Treviño MD, MHS  23

## 2023-06-30 LAB — HGB BLD-MCNC: 11.3 G/DL (ref 11.7–15.7)

## 2023-06-30 PROCEDURE — 36415 COLL VENOUS BLD VENIPUNCTURE: CPT | Performed by: STUDENT IN AN ORGANIZED HEALTH CARE EDUCATION/TRAINING PROGRAM

## 2023-06-30 PROCEDURE — 250N000009 HC RX 250: Performed by: STUDENT IN AN ORGANIZED HEALTH CARE EDUCATION/TRAINING PROGRAM

## 2023-06-30 PROCEDURE — 59400 OBSTETRICAL CARE: CPT | Performed by: STUDENT IN AN ORGANIZED HEALTH CARE EDUCATION/TRAINING PROGRAM

## 2023-06-30 PROCEDURE — 250N000011 HC RX IP 250 OP 636: Performed by: ANESTHESIOLOGY

## 2023-06-30 PROCEDURE — 85018 HEMOGLOBIN: CPT | Performed by: STUDENT IN AN ORGANIZED HEALTH CARE EDUCATION/TRAINING PROGRAM

## 2023-06-30 PROCEDURE — 722N000001 HC LABOR CARE VAGINAL DELIVERY SINGLE

## 2023-06-30 PROCEDURE — 120N000012 HC R&B POSTPARTUM

## 2023-06-30 PROCEDURE — 0KQM0ZZ REPAIR PERINEUM MUSCLE, OPEN APPROACH: ICD-10-PCS | Performed by: STUDENT IN AN ORGANIZED HEALTH CARE EDUCATION/TRAINING PROGRAM

## 2023-06-30 PROCEDURE — 250N000013 HC RX MED GY IP 250 OP 250 PS 637: Performed by: STUDENT IN AN ORGANIZED HEALTH CARE EDUCATION/TRAINING PROGRAM

## 2023-06-30 RX ORDER — METHYLERGONOVINE MALEATE 0.2 MG/ML
200 INJECTION INTRAVENOUS
Status: DISCONTINUED | OUTPATIENT
Start: 2023-06-30 | End: 2023-07-01 | Stop reason: HOSPADM

## 2023-06-30 RX ORDER — TRANEXAMIC ACID 10 MG/ML
1 INJECTION, SOLUTION INTRAVENOUS EVERY 30 MIN PRN
Status: DISCONTINUED | OUTPATIENT
Start: 2023-06-30 | End: 2023-07-01 | Stop reason: HOSPADM

## 2023-06-30 RX ORDER — MODIFIED LANOLIN
OINTMENT (GRAM) TOPICAL
Status: DISCONTINUED | OUTPATIENT
Start: 2023-06-30 | End: 2023-07-01 | Stop reason: HOSPADM

## 2023-06-30 RX ORDER — OXYTOCIN 10 [USP'U]/ML
10 INJECTION, SOLUTION INTRAMUSCULAR; INTRAVENOUS
Status: DISCONTINUED | OUTPATIENT
Start: 2023-06-30 | End: 2023-07-01 | Stop reason: HOSPADM

## 2023-06-30 RX ORDER — MISOPROSTOL 200 UG/1
400 TABLET ORAL
Status: DISCONTINUED | OUTPATIENT
Start: 2023-06-30 | End: 2023-07-01 | Stop reason: HOSPADM

## 2023-06-30 RX ORDER — OXYTOCIN/0.9 % SODIUM CHLORIDE 30/500 ML
340 PLASTIC BAG, INJECTION (ML) INTRAVENOUS CONTINUOUS PRN
Status: DISCONTINUED | OUTPATIENT
Start: 2023-06-30 | End: 2023-07-01 | Stop reason: HOSPADM

## 2023-06-30 RX ORDER — IBUPROFEN 400 MG/1
800 TABLET, FILM COATED ORAL EVERY 6 HOURS PRN
Status: DISCONTINUED | OUTPATIENT
Start: 2023-06-30 | End: 2023-07-01 | Stop reason: HOSPADM

## 2023-06-30 RX ORDER — HYDROCORTISONE 25 MG/G
CREAM TOPICAL 3 TIMES DAILY PRN
Status: DISCONTINUED | OUTPATIENT
Start: 2023-06-30 | End: 2023-07-01 | Stop reason: HOSPADM

## 2023-06-30 RX ORDER — BISACODYL 10 MG
10 SUPPOSITORY, RECTAL RECTAL DAILY PRN
Status: DISCONTINUED | OUTPATIENT
Start: 2023-06-30 | End: 2023-07-01 | Stop reason: HOSPADM

## 2023-06-30 RX ORDER — ACETAMINOPHEN 325 MG/1
650 TABLET ORAL EVERY 4 HOURS PRN
Status: DISCONTINUED | OUTPATIENT
Start: 2023-06-30 | End: 2023-07-01 | Stop reason: HOSPADM

## 2023-06-30 RX ORDER — CARBOPROST TROMETHAMINE 250 UG/ML
250 INJECTION, SOLUTION INTRAMUSCULAR
Status: DISCONTINUED | OUTPATIENT
Start: 2023-06-30 | End: 2023-07-01 | Stop reason: HOSPADM

## 2023-06-30 RX ORDER — MISOPROSTOL 200 UG/1
800 TABLET ORAL
Status: DISCONTINUED | OUTPATIENT
Start: 2023-06-30 | End: 2023-07-01 | Stop reason: HOSPADM

## 2023-06-30 RX ORDER — DOCUSATE SODIUM 100 MG/1
100 CAPSULE, LIQUID FILLED ORAL DAILY
Status: DISCONTINUED | OUTPATIENT
Start: 2023-06-30 | End: 2023-07-01 | Stop reason: HOSPADM

## 2023-06-30 RX ADMIN — IBUPROFEN 800 MG: 400 TABLET ORAL at 01:36

## 2023-06-30 RX ADMIN — ACETAMINOPHEN 650 MG: 325 TABLET, FILM COATED ORAL at 14:40

## 2023-06-30 RX ADMIN — ACETAMINOPHEN 650 MG: 325 TABLET, FILM COATED ORAL at 08:02

## 2023-06-30 RX ADMIN — Medication 340 ML/HR: at 00:17

## 2023-06-30 RX ADMIN — FENTANYL CITRATE 100 MCG: 50 INJECTION, SOLUTION INTRAMUSCULAR; INTRAVENOUS at 00:09

## 2023-06-30 RX ADMIN — ACETAMINOPHEN 650 MG: 325 TABLET, FILM COATED ORAL at 01:36

## 2023-06-30 RX ADMIN — DOCUSATE SODIUM 100 MG: 100 CAPSULE, LIQUID FILLED ORAL at 08:01

## 2023-06-30 RX ADMIN — IBUPROFEN 800 MG: 400 TABLET ORAL at 14:40

## 2023-06-30 RX ADMIN — IBUPROFEN 800 MG: 400 TABLET ORAL at 21:42

## 2023-06-30 RX ADMIN — IBUPROFEN 800 MG: 400 TABLET ORAL at 08:01

## 2023-06-30 RX ADMIN — LIDOCAINE HYDROCHLORIDE 5 ML: 10 INJECTION, SOLUTION EPIDURAL; INFILTRATION; INTRACAUDAL; PERINEURAL at 00:23

## 2023-06-30 RX ADMIN — ACETAMINOPHEN 650 MG: 325 TABLET, FILM COATED ORAL at 21:42

## 2023-06-30 RX ADMIN — ROPIVACAINE HYDROCHLORIDE 10 ML: 2 INJECTION, SOLUTION EPIDURAL; INFILTRATION at 00:09

## 2023-06-30 ASSESSMENT — ACTIVITIES OF DAILY LIVING (ADL)
ADLS_ACUITY_SCORE: 18

## 2023-06-30 NOTE — PROVIDER NOTIFICATION
06/29/23 3741   Provider Notification   Provider Name/Title Dr. Treviño   Method of Notification Phone     Spoke with Dr. Treviño on the phone regarding patient status. MD would like SVE repeated in 1 hour and update. All needs met at this time.

## 2023-06-30 NOTE — ANESTHESIA PREPROCEDURE EVALUATION
Anesthesia Pre-Procedure Evaluation    Patient: Poonam Urban   MRN: [id number] : 1990        Procedure : * No procedures listed *          Past Medical History:   Diagnosis Date     COVID-19 2022     Menarche 13 years old      Past Surgical History:   Procedure Laterality Date     DENTAL SURGERY  2008    [place] Teeth     EYE SURGERY       LASIK BILATERAL        Allergies   Allergen Reactions     Dairy Products [Milk Protein]       Social History     Tobacco Use     Smoking status: Never     Smokeless tobacco: Never   Substance Use Topics     Alcohol use: Not Currently     Alcohol/week: 2.5 - 3.3 standard drinks of alcohol     Types: 3 - 4 Standard drinks or equivalent per week      Wt Readings from Last 1 Encounters:   23 92.1 kg (203 lb)        Anesthesia Evaluation   Pt has had prior anesthetic.     No history of anesthetic complications       ROS/MED HX  ENT/Pulmonary:    (-) asthma   Neurologic:  - neg neurologic ROS     Cardiovascular:    (-) PIH   METS/Exercise Tolerance:     Hematologic:     (+) no anticoagulation therapy, no coagulopathy,     Musculoskeletal:       GI/Hepatic:     (+) GERD,     Renal/Genitourinary:       Endo:       Psychiatric/Substance Use:       Infectious Disease:       Malignancy:       Other:            Physical Exam    Airway        Mallampati: II   TM distance: > 3 FB   Neck ROM: full   Mouth opening: > 3 cm    Respiratory Devices and Support         Dental  no notable dental history         Cardiovascular   cardiovascular exam normal          Pulmonary   pulmonary exam normal                OUTSIDE LABS:  CBC:   Lab Results   Component Value Date    WBC 11.8 (H) 2023    WBC 10.8 12/15/2022    HGB 11.0 (L) 2023    HGB 12.2 12/15/2022    HCT 32.9 (L) 2023    HCT 36.1 12/15/2022     2023     12/15/2022     BMP:   Lab Results   Component Value Date     2023    POTASSIUM 3.8 2023    CHLORIDE 104 2023     CO2 19 (L) 06/29/2023    BUN 5.5 (L) 06/29/2023    CR 0.63 06/29/2023    GLC 85 06/29/2023    GLC 75 03/08/2019     COAGS: No results found for: PTT, INR, FIBR  POC: No results found for: BGM, HCG, HCGS  HEPATIC:   Lab Results   Component Value Date    ALBUMIN 3.5 06/29/2023    PROTTOTAL 6.9 06/29/2023    ALT 15 06/29/2023    AST 20 06/29/2023    ALKPHOS 187 (H) 06/29/2023    BILITOTAL 0.2 06/29/2023     OTHER:   Lab Results   Component Value Date    A1C 5.2 08/17/2020    TANNA 9.0 06/29/2023    PHOS 2.9 06/29/2023    MAG 2.0 06/29/2023    TSH 1.48 06/29/2023       Anesthesia Plan    ASA Status:  2      Anesthesia Type: Epidural.              Consents    Anesthesia Plan(s) and associated risks, benefits, and realistic alternatives discussed. Questions answered and patient/representative(s) expressed understanding.    - Discussed:     - Discussed with:  Patient         Postoperative Care            Comments:    Other Comments: Orders to manage the epidural infusion have been entered, and through coordination with the nurse, we will continute to manage and monitor the patient's labor epidural.  We will continuously be available to adjust as needed thruout the entire L&D process.             Ramo Turner MD

## 2023-06-30 NOTE — L&D DELIVERY NOTE
OB Vaginal Delivery Note    Poonam Urban MRN# [id number]   Age: 32 year old YOB: 1990       GA: 38w2d  GP:   Labor Complications: None   EBL:   mL  Delivery QBL: 110 mL  Delivery Type: Vaginal, Spontaneous   ROM to Delivery Time: (Delivered) Hours: 1 Minutes: 38  Worth Weight:     1 Minute 5 Minute 10 Minute   Apgar Totals:                 Delivery Details:  Ms. Poonam Urban is a 32 year old now  at 38w2d, admitted for IOL for fetal arrhythmia incidentally noted on routine doptones. After discussion with MFM, options for monitoring vs delivery were discussed and patient elected to proceed with IOL. She received IV pitocin and AROM with clear fluid for augmentation of labor. Epidural for pain control. GBS negative, no antibiotics.  Liveborn male infant delivered via  at 0016 on 23 weight pending. Clear amniotic fluid. Shoulders delivered easily. Nuchal cord x 1. Infant with spontaneous cry, with APGARs pending, see delivery summary. Placed on mother's chest. Reassuring FHR noted immediately after delivery per NICU. Placenta delivered with gentle cord traction; noted to be intact with 3 vessel cord. Second degree tears, repaired. Fundus firm and lochia minimal at the end of the case.  cc.      Betsey Treviño MD, MD, M[facility]  Ob/Gyn   2023         Patience Urban [[id number]]    Labor Length    3rd Stage (hrs): 0 (min): 3      Labor Events     labor?: No   steroids: None  Labor Type: Induction/Cervical ripening  Predominate monitoring during 1st stage: continuous electronic fetal monitoring     Antibiotics received during labor?: No     Rupture date/time: 23   Rupture type: Artificial Rupture of Membranes  Fluid color: Clear  Fluid odor: Normal     Induction: Oxytocin, AROM  Induction date/time:      Cervical ripening date/time:      Indications for induction: Fetal Indications or Congenital Malformations (Comment to specify)      Augmentation: Oxytocin, AROM  Indications for augmentation: Fetal Heart Rate or Rhythm Abnormality     Delivery/Placenta Date and Time    Delivery Date: 6/30/23 Delivery Time: 12:16 AM   Placenta Date/Time: 6/30/2023 12:19 AM  Oxytocin given at the time of delivery: after delivery of baby  Delivering clinician: Betsey Treviño MD          Vaginal Counts          Needles Suture Needles Sponges (RETIRED) Instruments   Initial counts 2 0 5    Added to count  2     Relief counts 2 2 5    Final counts 2 2 5          Placed during labor Accounted for at the end of labor   FSE     IUPC     Cervidil                       Apgars    Living status: Living   1 Minute 5 Minute 10 Minute 15 Minute 20 Minute   Skin color:        Heart rate:        Reflex irritability:        Muscle tone:        Respiratory effort:        Total:           Cord    Vessels: 3 Vessels    Cord Complications: Nuchal   Nuchal Intervention: delivered through         Nuchal cord description: tight nuchal cord         Gases Sent?: No    Delayed cord clamping?: Yes    Cord Clamping Delay (seconds): 31-60 seconds    Stem cell collection?: No       Labor Events and Shoulder Dystocia    Fetal Tracing Prior to Delivery: Category 2  Shoulder dystocia present?: Neg     Delivery (Maternal) (Provider to Complete) ([id number])    Episiotomy: None  Perineal lacerations: 2nd Repaired?: Yes   Repair suture: 3-0 Vicryl  Genital tract inspection done: Pos     Blood Loss  Mother: Poonam Urban #[id number]   Start of Mother's Information    Delivery Blood Loss  06/29/23 1216 - 06/30/23 0049    Delivery QBL (mL) Hospital Encounter 110 mL    Total  110 mL         End of Mother's Information  Mother: Poonam Urban #[id number]          Delivery - Provider to Complete ([id number])    Delivering clinician: Betsey Treviño MD  Delivery Type (Choose the 1 that will go to the Birth History): Vaginal, Spontaneous                                 Placenta    Date/Time: 6/30/2023  12:19 AM  Removal: Expressed  Disposition: Hospital disposal           Anesthesia    Method: Epidural                Presentation and Position    Presentation: Vertex     Occiput Anterior                 Betsey Treviño MD

## 2023-06-30 NOTE — PROGRESS NOTES
I was asked to perform AROM to augment labor by Dr. Hudson Amezquita 7/70/-2 AROM - clear fluid  Vertex  FHT's 140's with mod variability + accels and no decels - cat 1 tracing q 2-3 min    Electronically signed by   MD Latisha Berger Ob/Gyn  [phone]  6/30/23  10:48 PM

## 2023-06-30 NOTE — ANESTHESIA PROCEDURE NOTES
"Epidural catheter Procedure Note    Pre-Procedure   Staff -        Anesthesiologist:  Ramo Turner MD       Performed By: anesthesiologist       Referred By: OB       Location: OB       Pre-Anesthestic Checklist: patient identified, IV checked, risks and benefits discussed, informed consent, monitors and equipment checked, pre-op evaluation and at physician/surgeon's request  Timeout:       Correct Patient: Yes        Correct Procedure: Yes        Correct Site: Yes        Correct Position: Yes   Procedure Documentation  Procedure: epidural catheter       Patient Position: sitting       Patient Prep/Sterile Barriers: sterile gloves, mask, patient draped       Skin prep: Betadine       Local skin infiltrated with mL of 1% lidocaine.        Insertion Site: L3-4. (midline approach).       Technique: LORT saline        NOAH at 4 cm.       Needle Type: TripleGifty needle       Needle Gauge: 17.        Needle Length (Inches): 3.5           Catheter threaded easily.         3 cm epidural space.           # of attempts: 1 and  # of redirects:     Assessment/Narrative         Paresthesias: No.       Test dose of 3 mL lidocaine 1.5% w/ 1:200,000 epinephrine at.         Test dose negative, 3 minutes after injection, for signs of intravascular, subdural, or intrathecal injection.       Insertion/Infusion Method: LORT saline       Aspiration negative for Heme or CSF via Epidural Catheter.    Medication(s) Administered   0.2% Ropivacaine (Epidural) - EPIDURAL   10 mL - 6/29/2023 10:05:00 PM   Comments:  No complications. Sterile Dressing. Orders to manage the epidural infusion have been entered and, through coordination with the nurse, we will continue to manage and monitor the patient's labor epidural.  We will continuously be available to adjust as needed throughout the entire labor and delivery process.        FOR [facility] ([facility]) ONLY:   Pain Team Contact information: please page the Pain Team Via [employer]. Search \"Pain\". " During daytime hours, please page the attending first. At night please page the resident first.

## 2023-06-30 NOTE — PROVIDER NOTIFICATION
06/29/23 2331   Provider Notification   Provider Name/Title Dr. Treviño   Method of Notification Phone   Request Evaluate - Remote   Notification Reason Decels     Spoke with Dr. Treviño regarding decels. Continue to monitor and update as needed. TORB.

## 2023-06-30 NOTE — PLAN OF CARE
Goal Outcome Evaluation:  Pt, support person, and infant transferred to unit at 0251 accompanied by labor RN.  ID bands double verified.  Pt oriented to room and call light placed within reach.  Safety protocols including band checks, safe sleep practices, and bulb syringe use reviewed. Pt verbally acknowledged understanding of teaching. Encouraged to call w/questions or concerns. VSS on RA.  Fundus firm, midline, U/ U .  Scant lochia rubra. Pt is able to empty bladder independently. Voiding adequately.  Up independently.  Pain managed w/Tylenol and Ibuprofen.  Pt breastfeeding independently.  Bonding well w/infant.  Independent w/infant cares.  Spouse supportive at bedside.  Nursing to continue to monitor.

## 2023-06-30 NOTE — PLAN OF CARE
Data: Poonam Urban transferred to [facility] room 414 via wheelchair at 0300. Baby transferred via parent's arms.  Action: Receiving unit notified of transfer: Yes. Patient and family notified of room change. Report given to Radha KONG at 0300. Belongings sent to receiving unit. Accompanied by Registered Nurse. Oriented patient to surroundings. Call light within reach. ID bands double-checked with receiving RN.  Response: Patient tolerated transfer and is stable.

## 2023-07-01 VITALS
WEIGHT: 203 LBS | RESPIRATION RATE: 16 BRPM | TEMPERATURE: 98.1 F | HEART RATE: 61 BPM | HEIGHT: 66 IN | OXYGEN SATURATION: 97 % | SYSTOLIC BLOOD PRESSURE: 116 MMHG | BODY MASS INDEX: 32.62 KG/M2 | DIASTOLIC BLOOD PRESSURE: 66 MMHG

## 2023-07-01 PROBLEM — Z36.89 ENCOUNTER FOR TRIAGE IN PREGNANT PATIENT: Status: RESOLVED | Noted: 2023-06-09 | Resolved: 2023-07-01

## 2023-07-01 PROCEDURE — 250N000013 HC RX MED GY IP 250 OP 250 PS 637: Performed by: STUDENT IN AN ORGANIZED HEALTH CARE EDUCATION/TRAINING PROGRAM

## 2023-07-01 RX ORDER — DOCUSATE SODIUM 100 MG/1
100 CAPSULE, LIQUID FILLED ORAL DAILY
Qty: 30 CAPSULE | Refills: 1 | Status: SHIPPED | OUTPATIENT
Start: 2023-07-01 | End: 2023-08-17

## 2023-07-01 RX ORDER — IBUPROFEN 800 MG/1
800 TABLET, FILM COATED ORAL EVERY 6 HOURS PRN
Qty: 30 TABLET | Refills: 1 | Status: SHIPPED | OUTPATIENT
Start: 2023-07-01 | End: 2023-08-17

## 2023-07-01 RX ORDER — ACETAMINOPHEN 325 MG/1
650 TABLET ORAL EVERY 4 HOURS PRN
Qty: 60 TABLET | Refills: 1 | Status: SHIPPED | OUTPATIENT
Start: 2023-07-01 | End: 2023-08-17

## 2023-07-01 RX ADMIN — IBUPROFEN 800 MG: 400 TABLET ORAL at 06:49

## 2023-07-01 RX ADMIN — DOCUSATE SODIUM 100 MG: 100 CAPSULE, LIQUID FILLED ORAL at 08:42

## 2023-07-01 RX ADMIN — ACETAMINOPHEN 650 MG: 325 TABLET, FILM COATED ORAL at 06:49

## 2023-07-01 ASSESSMENT — ACTIVITIES OF DAILY LIVING (ADL)
ADLS_ACUITY_SCORE: 18

## 2023-07-01 NOTE — PLAN OF CARE
Goal Outcome Evaluation:  VSS on RA.  Fundus firm, midline, U/ U .  Scant lochia rubra. Pt is able to empty bladder independently. Voiding adequately.  Up independently.  Pain managed w/Tylenol and Ibuprofen.  Pt breastfeeding independently.  Bonding well w/infant.  Independent w/infant cares.  Spouse supportive at bedside.  Nursing to continue to monitor.

## 2023-07-01 NOTE — PROGRESS NOTES
[facility]    Post-Partum Progress Note    Assessment & Plan   Assessment:  Post-partum day #2  Normal spontaneous vaginal delivery    Doing well.  No excessive bleeding  Pain well-controlled.    Plan:  Ambulation encouraged  Lactation consultation  Monitor wound for signs of infection  Pain control measures as needed  Reportable signs and symptoms dicussed with the patient  Discharge later today    Colette Nixon MD     Interval History   Doing well.  Pain is well-controlled.  No fevers.  No history of foul-smelling vaginal discharge.  Good appetite.  Denies chest pain, shortness of breath, nausea or vomiting.  Vaginal bleeding is similar to a heavy menstrual flow.  Ambulatory.  Breastfeeding without difficulty    Medications     - MEDICATION INSTRUCTIONS -       - MEDICATION INSTRUCTIONS -       oxytocin in 0.9% NaCl         docusate sodium  100 mg Oral Daily       Physical Exam   Temp: 97.3  F (36.3  C) Temp src: Oral BP: 107/64 Pulse: 62   Resp: 16   O2 Device: None (Room air)    Vitals:    06/29/23 1610   Weight: 92.1 kg (203 lb)     Vital Signs with Ranges  Temp:  [97.3  F (36.3  C)-98  F (36.7  C)] 97.3  F (36.3  C)  Pulse:  [62-67] 62  Resp:  [16] 16  BP: (104-111)/(64-76) 107/64  No intake/output data recorded.    Uterine fundus is firm, non-tender and at the level of the umbilicus  Extremities Non-tender  Heart is regular rate and rhythm and lungs clear to auscultation    Data   Recent Labs   Lab Test 06/29/23  1659 12/15/22  1408 06/05/21  0811   ABO  --   --  O   RH  --   --  Pos   AS Negative   < > Neg    < > = values in this interval not displayed.     Recent Labs   Lab Test 06/30/23  0935 05/02/23  1056 12/15/22  1408   WBC  --  11.8* 10.8   HGB 11.3* 11.0* 12.2     Recent Labs   Lab Test 12/15/22  1408   RUQIGG Positive

## 2023-07-01 NOTE — LACTATION NOTE
Routine Lactation visit with Poonam, significant other [place] & baby boy Steven. Getting ready for discharge. Poonam reports feeding is going well, breastfeeding frequently and latching well. Poonam shared breastfeeding did not go well with her last baby, so she's relieved Steven seems to be doing well so far.  Discussed cluster feeding, what it is and when to expect it, The Second Night, satiety cues, feeding cues, and reviewed Feeding Log for home use. Encouraged to review Breastfeeding section in Your Guide to Postpartum &  Care.    Reviewed milk supply and engorgement. Reviewed typical timeline of milk supply initiation and progression over first 3-5 days postpartum. Discussed comfort measures for engorgement, plugged duct treatment, and warning signs of breast infection. General questions answered regarding pumping, when it's helpful and necessary. Reviewed general recommendation to wait to start pumping until breastfeeding is well established unless there are feeding difficulties or engorgement not relieved by feeding baby or hand expression. Discussed introducing a bottle and recommendation to wait for bottle introduction for 3-4 weeks unless baby needs to supplement for medical reasons.    Feeding plan: Recommend unlimited, frequent breast feedings: At least 8 - 12 times every 24 hours. Avoid pacifiers and supplementation with formula unless medically indicated. Encouraged use of feeding log and to record feedings, and void/stool patterns. Poonam has a breast pump for home use. Follow up with [facility], encouraged Lactation follow up in clinic if needed. Reviewed outpatient lactation resources. Poonam & [place] very appreciative of visit.    Anat Alexander, RN-C, IBCLC, MNN, PHN, BSN

## 2023-07-01 NOTE — PLAN OF CARE
Goal Outcome Evaluation:Vital signs stable. Postpartum assessment WDL. Pain controlled with  oral meds. Patient voiding without difficulty. Breastfeeding on cue with no assist. Patient and infant bonding well. Will continue with current plan of care.    Pt going home today.      Plan of Care Reviewed With: patient, spouse    Overall Patient Progress: improvingOverall Patient Progress: improving

## 2023-07-01 NOTE — DISCHARGE INSTRUCTIONS
Postpartum Vaginal Delivery Instructions    Activity     Ask family and friends for help when you need it.  Do not place anything in your vagina for 6 weeks.  You are not restricted on other activities, but take it easy for a few weeks to allow your body to recover from delivery.  You are able to do any activities you feel up to that point.  No driving until you have stopped taking your pain medications (usually two weeks after delivery).     Call your health care provider if you have any of these symptoms:     Increased pain, swelling, redness, or fluid around your stiches from an episiotomy or perineal tear.  A fever above 100.4 F (38 C) with or without chills when placing a thermometer under your tongue.  You soak a sanitary pad with blood within 1 hour, or you see blood clots larger than a golf ball.  Bleeding that lasts more than 6 weeks.  Vaginal discharge that smells bad.  Severe pain, cramping or tenderness in your lower belly area.  A need to urinate more frequently (use the toilet more often), more urgently (use the toilet very quickly), or it burns when you urinate.  Nausea and vomiting.  Redness, swelling or pain around a vein in your leg.  Problems breastfeeding or a red or painful area on your breast.  Chest pain and cough or are gasping for air.  Problems coping with sadness, anxiety, or depression.  If you have any concerns about hurting yourself or the baby, call your provider immediately.   You have questions or concerns after you return home.     Keep your hands clean:  Always wash your hands before touching your perineal area and stitches.  This helps reduce your risk of infection.  If your hands aren't dirty, you may use an alcohol hand-rub to clean your hands. Keep your nails clean and short.    Warning Signs after Having a Baby    Keep this paper on your fridge or somewhere else where you can see it.    Call your provider if you have any of these symptoms up to 12 weeks after having your  baby.    Thoughts of hurting yourself or your baby  Pain in your chest or trouble breathing  Severe headache not helped by pain medicine  Eyesight concerns (blurry vision, seeing spots or flashes of light, other changes to eyesight)  Fainting, shaking or other signs of a seizure    Call 9-1-1 if you feel that it is an emergency.     The symptoms below can happen to anyone after giving birth. They can be very serious. Call your provider if you have any of these warning signs.    My provider s phone number: _______________________    Losing too much blood (hemorrhage)    Call your provider if you soak through a pad in less than an hour or pass blood clots bigger than a golf ball. These may be signs that you are bleeding too much.    Blood clots in the legs or lungs    After you give birth, your body naturally clots its blood to help prevent blood loss. Sometimes this increased clotting can happen in other areas of the body, like the legs or lungs. This can block your blood flow and be very dangerous.     Call your provider if you:  Have a red, swollen spot on the back of your leg that is warm or painful when you touch it.   Are coughing up blood.     Infection    Call your provider if you have any of these symptoms:  Fever of 100.4 F (38 C) or higher.  Pain or redness around your stitches if you had an incision.   Any yellow, white, or green fluid coming from places where you had stitches or surgery.    Mood Problems (postpartum depression)    Many people feel sad or have mood changes after having a baby. But for some people, these mood swings are worse.     Call your provider right away if you feel so anxious or nervous that you can't care for yourself or your baby.    Preeclampsia (high blood pressure)    Even if you didn't have high blood pressure when you were pregnant, you are at risk for the high blood pressure disease called preeclampsia. This risk can last up to 12 weeks after giving birth.     Call your  provider if you have:   Pain on your right side under your rib cage  Sudden swelling in the hands and face    Remember: You know your body. If something doesn't feel right, get medical help.     For informational purposes only. Not to replace the advice of your health care provider. Copyright 2020 [facility] [employer]. All rights reserved. Clinically reviewed by Fifi Madrid, RNC-OB, MSN. [employer] [id number] - Rev 02/23.

## 2023-07-06 ENCOUNTER — TELEPHONE (OUTPATIENT)
Dept: OBGYN | Facility: CLINIC | Age: 33
End: 2023-07-06

## 2023-07-06 NOTE — TELEPHONE ENCOUNTER
Type of Paperwork received:  FMLA     Date Rcvd:  7/6/2023    Rcvd From (Company name): [employer]    Provider:  KINDRA    Placed on Provider Cart Date:  7/6/2023         Diabetes/Influenza Vaccination/Stroke

## 2023-08-15 PROBLEM — O35.9XX0 FETAL ABNORMALITY AFFECTING MANAGEMENT OF MOTHER: Status: RESOLVED | Noted: 2023-06-29 | Resolved: 2023-08-15

## 2023-08-16 NOTE — PROGRESS NOTES
SUBJECTIVE:  Poonam Urban,  is here for a postpartum visit.  She had a  on 23 delivering a healthy baby boy, named Steven weighing 6 lbs 13 oz at term.      HPI:  Here today for postpartum visit --doing well.  Baby Steven has been very good --good eater and pretty good sleeper. Both breast and bottle feeding.  Going 2.5-4hrs between nighttime visits.  Recovery has gone well for Poonam.  Easier than her first.  STopped bleeding after 3-4wks.  Has not resumed SA.  Has used pills and condoms in the past --considering nexplanon this time.  No bowel/bladder issues  Moods good --denies depression or anxiety  Returns to work in 6 weeks and will be back parttime      Last PHQ-9 score on record=       2023    10:40 AM   PHQ-9 SCORE   PHQ-9 Total Score 0         2021     2:00 PM 2022    12:31 PM 2023    10:40 AM   SONIA-7 SCORE   Total Score  0 (minimal anxiety)    Total Score 0 0 0       Pap:   Lab Results   Component Value Date    PAP NIL 2020    PAP NIL 2017    PAP NIL 2013 WNL     Delivery complications:  No  Breast feeding:  Yes, breast, bottle and formula feeding  Bladder problems:  No  Bowel problems/hemorrhoids:  No  Episiotomy/laceration/incision healed? Yes  Vaginal flow:  None  Filley:  No  Contraception: unsure  Emotional adjustment:  doing well and happy  Back to work:  goes back to work in 6 more weeks    12 point review of systems negative other than symptoms noted below or in the HPI.    OBJECTIVE:  Vitals: /72   Wt 87.1 kg (192 lb)   LMP 10/05/2022 (Exact Date)   Breastfeeding Yes   BMI 30.99 kg/m    BMI= Body mass index is 30.99 kg/m .  General - pleasant female in no acute distress.  Breast -  deferred  Abdomen - No incision  Pelvic - EG: normal adult female, BUS: within normal limits, Vagina: well rugated, no discharge, Cervix: no lesions or CMT, Uterus: firm, normal sized and nontender, midplane in position. Adnexae: no masses  or tenderness.  Rectovaginal - deferred.    ASSESSMENT:    ICD-10-CM    1. Encounter for postpartum visit  Z39.2       2. Encounter for other general counseling or advice on contraception  Z30.09           PLAN:  May resume normal activities without restrictions.  Pap smear was not done today.    Full counseling was provided, and all questions were answered.   Return to clinic in one year for an annual visit.     Patient Instructions   Follow up with your primary care provider for your other medical problems.  Continue self breast exam.  Increase physical activity and exercise.  Usual safety and preventative measures counseling done.  BMI >25  Weight loss encouraged.  Last pap smear (2020) was normal and negative for the DNA of high risk HPV subtypes.  No pap was obtained this year.  This was discussed with the patient and she agrees with the plan.  Discussed safe option for contraception while breast feeding; will return next week for nexplanon placement.     Sindhu Middleton MD

## 2023-08-17 ENCOUNTER — PRENATAL OFFICE VISIT (OUTPATIENT)
Dept: OBGYN | Facility: CLINIC | Age: 33
End: 2023-08-17
Payer: COMMERCIAL

## 2023-08-17 VITALS — SYSTOLIC BLOOD PRESSURE: 120 MMHG | WEIGHT: 192 LBS | DIASTOLIC BLOOD PRESSURE: 72 MMHG | BODY MASS INDEX: 30.99 KG/M2

## 2023-08-17 DIAGNOSIS — Z30.09 ENCOUNTER FOR OTHER GENERAL COUNSELING OR ADVICE ON CONTRACEPTION: ICD-10-CM

## 2023-08-17 PROCEDURE — 99207 PR POST PARTUM EXAM: CPT | Performed by: OBSTETRICS & GYNECOLOGY

## 2023-08-17 ASSESSMENT — ANXIETY QUESTIONNAIRES
GAD7 TOTAL SCORE: 0
GAD7 TOTAL SCORE: 0
2. NOT BEING ABLE TO STOP OR CONTROL WORRYING: NOT AT ALL
3. WORRYING TOO MUCH ABOUT DIFFERENT THINGS: NOT AT ALL
7. FEELING AFRAID AS IF SOMETHING AWFUL MIGHT HAPPEN: NOT AT ALL
5. BEING SO RESTLESS THAT IT IS HARD TO SIT STILL: NOT AT ALL
IF YOU CHECKED OFF ANY PROBLEMS ON THIS QUESTIONNAIRE, HOW DIFFICULT HAVE THESE PROBLEMS MADE IT FOR YOU TO DO YOUR WORK, TAKE CARE OF THINGS AT HOME, OR GET ALONG WITH OTHER PEOPLE: NOT DIFFICULT AT ALL
6. BECOMING EASILY ANNOYED OR IRRITABLE: NOT AT ALL
1. FEELING NERVOUS, ANXIOUS, OR ON EDGE: NOT AT ALL

## 2023-08-17 ASSESSMENT — PATIENT HEALTH QUESTIONNAIRE - PHQ9
5. POOR APPETITE OR OVEREATING: NOT AT ALL
SUM OF ALL RESPONSES TO PHQ QUESTIONS 1-9: 0

## 2023-08-17 NOTE — PATIENT INSTRUCTIONS
Follow up with your primary care provider for your other medical problems.  Continue self breast exam.  Increase physical activity and exercise.  Usual safety and preventative measures counseling done.  BMI >25  Weight loss encouraged.  Last pap smear (2020) was normal and negative for the DNA of high risk HPV subtypes.  No pap was obtained this year.  This was discussed with the patient and she agrees with the plan.  Discussed safe option for contraception while breast feeding; will return next week for nexplanon placement.

## 2023-08-23 ENCOUNTER — OFFICE VISIT (OUTPATIENT)
Dept: OBGYN | Facility: CLINIC | Age: 33
End: 2023-08-23
Payer: COMMERCIAL

## 2023-08-23 DIAGNOSIS — Z30.017 INSERTION OF IMPLANTABLE SUBDERMAL CONTRACEPTIVE: Primary | ICD-10-CM

## 2023-08-23 PROBLEM — Z34.80 SUPERVISION OF NORMAL INTRAUTERINE PREGNANCY IN MULTIGRAVIDA: Status: RESOLVED | Noted: 2022-12-06 | Resolved: 2023-08-23

## 2023-08-23 PROBLEM — Z97.5 NEXPLANON IN PLACE: Status: ACTIVE | Noted: 2023-08-23

## 2023-08-23 PROCEDURE — 11981 INSERTION DRUG DLVR IMPLANT: CPT | Performed by: OBSTETRICS & GYNECOLOGY

## 2023-08-23 NOTE — PROGRESS NOTES
Nexplanon Insertion:    Is a pregnancy test required: No.  Was a consent obtained?  Yes    Subjective: Poonam Urban is a 33 year old  presents for Nexplanon.    Patient has been given the opportunity to ask questions about all forms of birth control, including all options appropriate for Poonam Urban. Discussed that no method of birth control, except abstinence is 100% effective against pregnancy or sexually transmitted infection.     Poonam Urban understands she may have the Nexplanon removed at any time and it should be removed by a health care provider.    The entire insertion procedure was reviewed with the patient, including care after placement.    No LMP recorded. Last sexual activity: prior to delivery of son . No allergy to betadine or shellfish. Patient declines STD screening  No results found for: HCG      There were no vitals taken for this visit.    PROCEDURE NOTE: -- Nexplanon Insertion    Reason for Insertion: contraception    Patient was placed supine with left arm exposed.  Shayne was made 8-10 cm above medial epicondyle and a guiding shayne 4 cm above the first.  Arm was prepped with Betadine. Insertion point was anesthetized with 5 mL 1% lidocaine. After stretching the skin with thumb and index finger around the insertion site, skin punctured with the tip of the needle inserted at 30 degrees and then lowered to horizontal position. The needle was then advanced to its full length. Applicator was then stabilized and slider was unlocked. Slider was pulled back until it stopped and then removed.    Correct placement of the implant was confirmed by palpation in the patient's arm and visualizing the purple top of the obturator.   Bandage and pressure dressing applied to insertion site.    Lot # [id number]  Exp: 2025    EBL: minimal    Complications: none    ASSESSMENT:     ICD-10-CM    1. Insertion of implantable subdermal contraceptive  Z30.017            PLAN:    Given 's  handouts, including when to have Nexplanon removed, list of danger s/sx, side effects and follow up recommended. Encouraged condom use for prevention of STD. Back up contraception advised for 7 days. Advised to call for any fever, for prolonged or severe pain or bleeding, abnormal vaginal dischage. She was advised to use pain medications (ibuprofen) as needed for mild to moderate pain.     Patient Instructions   Nexplanon placed today without issues.  Will use backup contraception for first week.  Discussed common side effects of breakthrough bleeding/spotting with nexplanon.    Sindhu Middleton MD

## 2025-01-14 DIAGNOSIS — Z84.89 FAMILY HISTORY OF GENETIC DISORDER: Primary | ICD-10-CM

## 2025-01-19 ENCOUNTER — VIRTUAL VISIT (OUTPATIENT)
Dept: URGENT CARE | Facility: CLINIC | Age: 35
End: 2025-01-19
Payer: COMMERCIAL

## 2025-01-19 DIAGNOSIS — J06.9 UPPER RESPIRATORY TRACT INFECTION, UNSPECIFIED TYPE: Primary | ICD-10-CM

## 2025-01-19 PROCEDURE — 98005 SYNCH AUDIO-VIDEO EST LOW 20: CPT

## 2025-01-19 NOTE — PROGRESS NOTES
"  Poonam Urban is a 34 year old female who is being evaluated via a billable video visit.      The patient has been notified of following at the time of scheduling video visit:     \"This video visit will be conducted via a video call between you and your physician/provider. We have found that certain health care needs can be provided without the need for a physical exam.  This service lets us provide the care you need with a video conversation.  If a prescription is necessary we can send it directly to your pharmacy.  If lab work is needed we can place an order for that and you can then stop by our lab to have the test done at a later time.\"   Patient has given consent for video visit?  YES    SUBJECTIVE:  Poonam Urban is an 34 year old female who presents for not feeling well.  Has had a cold for five days.  Her toddlers have similar sxs.  She has had some sore throat, runny nose, and a mild cough.  Negative home covid test. Over the last day or so has had a litle bit of eye drainage but no pain or significant itching in eyes.  Today had some ear pressure and pain for a couple hours which has improved after taking a decongestant.  Has taken ibuprofen some as well.  No fevers. Not having body aches.    PMH:   has a past medical history of COVID-19 (05/2022), Menarche (13 years old), and Nexplanon in place (08/23/2023).  Patient Active Problem List   Diagnosis    Acne    Tinea versicolor due to Malassezia furfur    PCOS (polycystic ovarian syndrome)    Nexplanon in place     Social History     Socioeconomic History    Marital status:      Spouse name: [place]    Number of children: 2   Occupational History    Occupation: [employer]   Tobacco Use    Smoking status: Never    Smokeless tobacco: Never   Vaping Use    Vaping status: Never Used   Substance and Sexual Activity    Alcohol use: Not Currently     Alcohol/week: 2.5 - 3.3 standard drinks of alcohol     Types: 3 - 4 Standard drinks or equivalent per " week    Drug use: No    Sexual activity: Yes     Partners: Male     Birth control/protection: None   Other Topics Concern    Parent/sibling w/ CABG, MI or angioplasty before 65F 55M? No     Family History   Problem Relation Age of Onset    No Known Problems Mother     No Known Problems Father     No Known Problems Sister     No Known Problems Brother     No Known Problems Maternal Grandmother     No Known Problems Maternal Grandfather     No Known Problems Paternal Grandmother     Other Cancer Paternal Grandfather     No Known Problems Other     Cancer No family hx of         skin cancer       ALLERGIES:  Dairy products [milk protein]    Current Outpatient Medications   Medication Sig Dispense Refill    etonogestrel (NEXPLANON) 68 MG IMPL 1 each (68 mg) by Subdermal route once      Prenatal Vit-Fe Fumarate-FA (PRENATAL MULTIVITAMIN W/IRON) 27-0.8 MG tablet Take 1 tablet by mouth daily       No current facility-administered medications for this visit.         ROS:  ROS is done and is negative for general/constitutional, eye, ENT, Respiratory, cardiovascular, GI, , Skin, musculoskeletal except as noted elsewhere.  All other review of systems negative except as noted elsewhere.      OBJECTIVE:    No vital signs obtained as is virtual visit    GENERAL: alert and no distress  EYES: Eyes grossly normal to inspection.  No discharge or erythema, or obvious scleral/conjunctival abnormalities.  NOSE: sounds mildly congested  RESP: No audible wheeze, cough, or visible cyanosis.    SKIN: Visible skin clear. No significant rash, abnormal pigmentation or lesions.  NEURO: Cranial nerves grossly intact.  Mentation and speech appropriate for age.  PSYCH: Appropriate affect, tone, and pace of words           ASSESSMENT/PLAN:    ASSESSMENT / PLAN:  (J06.9) Upper respiratory tract infection, unspecified type  (primary encounter diagnosis)  Comment: currently sxs are most c/with viral etiology.  As ear pain resolved within 2 hours,  suspect has increased fluid behind TM but doubtful it is an infection that needs abx at this time.  Offered cough medication rx but pt declined. Advised flonase nasal spray and decongestant as needed.  Plan: I reviewed supportive care, otc meds to use if needed, expected course, and signs of concern.  Follow up as needed or if does not improve within 5 day(s) or if worsens in any way.  Reviewed red flag symptoms and is to go to the ER if experiences any of these.        See [facility] for orders, medications, letters, patient instructions    Juana Welch MD  1/19/2025, 1:19 PM    Video-Visit Details    Video Start Time:  1:20    Type of service:  Video Visit    Video End Time:1:40 PM    Originating Location (pt. Location): Home    Distant Location (provider location):  [facility]     Platform used for Video Visit: [employer]